# Patient Record
Sex: MALE | Race: WHITE | NOT HISPANIC OR LATINO | Employment: UNEMPLOYED | URBAN - METROPOLITAN AREA
[De-identification: names, ages, dates, MRNs, and addresses within clinical notes are randomized per-mention and may not be internally consistent; named-entity substitution may affect disease eponyms.]

---

## 2024-03-05 ENCOUNTER — APPOINTMENT (OUTPATIENT)
Dept: GENERAL RADIOLOGY | Facility: HOSPITAL | Age: 74
DRG: 066 | End: 2024-03-05
Payer: MEDICARE

## 2024-03-05 ENCOUNTER — APPOINTMENT (OUTPATIENT)
Dept: CT IMAGING | Facility: HOSPITAL | Age: 74
DRG: 066 | End: 2024-03-05
Payer: MEDICARE

## 2024-03-05 ENCOUNTER — HOSPITAL ENCOUNTER (INPATIENT)
Facility: HOSPITAL | Age: 74
LOS: 1 days | Discharge: HOME HEALTH | DRG: 066 | End: 2024-03-07
Attending: EMERGENCY MEDICINE | Admitting: FAMILY MEDICINE
Payer: MEDICARE

## 2024-03-05 DIAGNOSIS — R79.89 ELEVATED TROPONIN: ICD-10-CM

## 2024-03-05 DIAGNOSIS — G45.9 TIA (TRANSIENT ISCHEMIC ATTACK): Primary | ICD-10-CM

## 2024-03-05 LAB
ABO GROUP BLD: NORMAL
ALBUMIN SERPL-MCNC: 4.1 G/DL (ref 3.5–5.2)
ALBUMIN/GLOB SERPL: 1.2 G/DL
ALP SERPL-CCNC: 87 U/L (ref 39–117)
ALT SERPL W P-5'-P-CCNC: 9 U/L (ref 1–41)
ANION GAP SERPL CALCULATED.3IONS-SCNC: 10 MMOL/L (ref 5–15)
APTT PPP: 28 SECONDS (ref 24–31)
AST SERPL-CCNC: 28 U/L (ref 1–40)
BASOPHILS # BLD AUTO: 0 10*3/MM3 (ref 0–0.2)
BASOPHILS NFR BLD AUTO: 0.6 % (ref 0–1.5)
BILIRUB SERPL-MCNC: 0.6 MG/DL (ref 0–1.2)
BLD GP AB SCN SERPL QL: NEGATIVE
BUN SERPL-MCNC: 9 MG/DL (ref 8–23)
BUN/CREAT SERPL: 8.4 (ref 7–25)
CALCIUM SPEC-SCNC: 10.2 MG/DL (ref 8.6–10.5)
CHLORIDE SERPL-SCNC: 101 MMOL/L (ref 98–107)
CO2 SERPL-SCNC: 29 MMOL/L (ref 22–29)
CREAT SERPL-MCNC: 1.07 MG/DL (ref 0.76–1.27)
DEPRECATED RDW RBC AUTO: 47.7 FL (ref 37–54)
EGFRCR SERPLBLD CKD-EPI 2021: 72.4 ML/MIN/1.73
EOSINOPHIL # BLD AUTO: 0.6 10*3/MM3 (ref 0–0.4)
EOSINOPHIL NFR BLD AUTO: 7.4 % (ref 0.3–6.2)
ERYTHROCYTE [DISTWIDTH] IN BLOOD BY AUTOMATED COUNT: 14.8 % (ref 12.3–15.4)
GEN 5 2HR TROPONIN T REFLEX: 102 NG/L
GLOBULIN UR ELPH-MCNC: 3.5 GM/DL
GLUCOSE BLDC GLUCOMTR-MCNC: 110 MG/DL (ref 70–105)
GLUCOSE SERPL-MCNC: 119 MG/DL (ref 65–99)
HCT VFR BLD AUTO: 41.7 % (ref 37.5–51)
HGB BLD-MCNC: 13.7 G/DL (ref 13–17.7)
HOLD SPECIMEN: NORMAL
INR PPP: 1.06 (ref 0.93–1.1)
LYMPHOCYTES # BLD AUTO: 1.7 10*3/MM3 (ref 0.7–3.1)
LYMPHOCYTES NFR BLD AUTO: 22.1 % (ref 19.6–45.3)
MCH RBC QN AUTO: 28.8 PG (ref 26.6–33)
MCHC RBC AUTO-ENTMCNC: 32.8 G/DL (ref 31.5–35.7)
MCV RBC AUTO: 87.7 FL (ref 79–97)
MONOCYTES # BLD AUTO: 0.6 10*3/MM3 (ref 0.1–0.9)
MONOCYTES NFR BLD AUTO: 8.5 % (ref 5–12)
NEUTROPHILS NFR BLD AUTO: 4.7 10*3/MM3 (ref 1.7–7)
NEUTROPHILS NFR BLD AUTO: 61.4 % (ref 42.7–76)
NRBC BLD AUTO-RTO: 0 /100 WBC (ref 0–0.2)
PLATELET # BLD AUTO: 261 10*3/MM3 (ref 140–450)
PMV BLD AUTO: 7.3 FL (ref 6–12)
POTASSIUM SERPL-SCNC: 4 MMOL/L (ref 3.5–5.2)
PROT SERPL-MCNC: 7.6 G/DL (ref 6–8.5)
PROTHROMBIN TIME: 11.5 SECONDS (ref 9.6–11.7)
RBC # BLD AUTO: 4.76 10*6/MM3 (ref 4.14–5.8)
RH BLD: POSITIVE
SODIUM SERPL-SCNC: 140 MMOL/L (ref 136–145)
T&S EXPIRATION DATE: NORMAL
TROPONIN T DELTA: -15 NG/L
TROPONIN T SERPL HS-MCNC: 117 NG/L
WBC NRBC COR # BLD AUTO: 7.6 10*3/MM3 (ref 3.4–10.8)
WHOLE BLOOD HOLD COAG: NORMAL
WHOLE BLOOD HOLD SPECIMEN: NORMAL

## 2024-03-05 PROCEDURE — 86900 BLOOD TYPING SEROLOGIC ABO: CPT | Performed by: EMERGENCY MEDICINE

## 2024-03-05 PROCEDURE — 70496 CT ANGIOGRAPHY HEAD: CPT

## 2024-03-05 PROCEDURE — 70450 CT HEAD/BRAIN W/O DYE: CPT

## 2024-03-05 PROCEDURE — G0378 HOSPITAL OBSERVATION PER HR: HCPCS

## 2024-03-05 PROCEDURE — 86850 RBC ANTIBODY SCREEN: CPT | Performed by: EMERGENCY MEDICINE

## 2024-03-05 PROCEDURE — 82948 REAGENT STRIP/BLOOD GLUCOSE: CPT | Performed by: EMERGENCY MEDICINE

## 2024-03-05 PROCEDURE — 71045 X-RAY EXAM CHEST 1 VIEW: CPT

## 2024-03-05 PROCEDURE — 86900 BLOOD TYPING SEROLOGIC ABO: CPT

## 2024-03-05 PROCEDURE — 70498 CT ANGIOGRAPHY NECK: CPT

## 2024-03-05 PROCEDURE — 80053 COMPREHEN METABOLIC PANEL: CPT | Performed by: EMERGENCY MEDICINE

## 2024-03-05 PROCEDURE — 82607 VITAMIN B-12: CPT | Performed by: NURSE PRACTITIONER

## 2024-03-05 PROCEDURE — 99285 EMERGENCY DEPT VISIT HI MDM: CPT

## 2024-03-05 PROCEDURE — 86901 BLOOD TYPING SEROLOGIC RH(D): CPT

## 2024-03-05 PROCEDURE — 85025 COMPLETE CBC W/AUTO DIFF WBC: CPT | Performed by: EMERGENCY MEDICINE

## 2024-03-05 PROCEDURE — 86901 BLOOD TYPING SEROLOGIC RH(D): CPT | Performed by: EMERGENCY MEDICINE

## 2024-03-05 PROCEDURE — 85730 THROMBOPLASTIN TIME PARTIAL: CPT | Performed by: EMERGENCY MEDICINE

## 2024-03-05 PROCEDURE — 85610 PROTHROMBIN TIME: CPT | Performed by: EMERGENCY MEDICINE

## 2024-03-05 PROCEDURE — 84484 ASSAY OF TROPONIN QUANT: CPT | Performed by: EMERGENCY MEDICINE

## 2024-03-05 PROCEDURE — 25510000001 IOPAMIDOL PER 1 ML: Performed by: EMERGENCY MEDICINE

## 2024-03-05 PROCEDURE — 93005 ELECTROCARDIOGRAM TRACING: CPT | Performed by: EMERGENCY MEDICINE

## 2024-03-05 RX ORDER — LATANOPROST 50 UG/ML
1 SOLUTION/ DROPS OPHTHALMIC NIGHTLY
COMMUNITY

## 2024-03-05 RX ORDER — ASPIRIN 325 MG
325 TABLET ORAL ONCE
Status: COMPLETED | OUTPATIENT
Start: 2024-03-05 | End: 2024-03-05

## 2024-03-05 RX ORDER — CLOPIDOGREL BISULFATE 75 MG/1
75 TABLET ORAL ONCE
Status: COMPLETED | OUTPATIENT
Start: 2024-03-05 | End: 2024-03-05

## 2024-03-05 RX ORDER — ROSUVASTATIN CALCIUM 10 MG/1
10 TABLET, COATED ORAL DAILY
COMMUNITY
End: 2024-03-07 | Stop reason: HOSPADM

## 2024-03-05 RX ORDER — SODIUM CHLORIDE 0.9 % (FLUSH) 0.9 %
10 SYRINGE (ML) INJECTION AS NEEDED
Status: DISCONTINUED | OUTPATIENT
Start: 2024-03-05 | End: 2024-03-07 | Stop reason: HOSPADM

## 2024-03-05 RX ORDER — IRBESARTAN AND HYDROCHLOROTHIAZIDE 150; 12.5 MG/1; MG/1
0.5 TABLET, FILM COATED ORAL DAILY
COMMUNITY

## 2024-03-05 RX ADMIN — ASPIRIN 325 MG ORAL TABLET 325 MG: 325 PILL ORAL at 21:38

## 2024-03-05 RX ADMIN — CLOPIDOGREL BISULFATE 75 MG: 75 TABLET ORAL at 21:38

## 2024-03-05 RX ADMIN — IOPAMIDOL 100 ML: 755 INJECTION, SOLUTION INTRAVENOUS at 17:56

## 2024-03-05 NOTE — Clinical Note
Level of Care: Telemetry [5]   Diagnosis: TIA (transient ischemic attack) [441645]   Admitting Physician: VIANEY FAGAN [[id number]]   Attending Physician: VIANEY FAGAN [[id number]]

## 2024-03-05 NOTE — ED NOTES
Pt states numbness started at home last night in R side face, R arm/hand, & R leg after long car ride; pt states symptoms subsided & is complaining of R arm numbness today; pt face symmetrical & equal, pt  strengths equal,; pt denies medical history at this time

## 2024-03-05 NOTE — ED PROVIDER NOTES
"Subjective   History of Present Illness  73-year-old male states he noted some right arm and leg weakness yesterday evening when he was in his recliner.  He states it lasted several minutes and resolved.  He states he also had some blurry vision at the time.  States today around noon he had the same but reports no blurry vision today.  He reports no headache.  He states his arm and leg felt numb and tingly.  He states that his leg has resolved he states he still feels slightly tingling in his right arm.  He reports no chest pain or abdominal pain or speech difficulty.  Review of Systems    No past medical history on file.    No Known Allergies    No past surgical history on file.    No family history on file.    Social History     Socioeconomic History    Marital status:      Prior to Admission medications    Not on File     /74 (BP Location: Left arm, Patient Position: Lying)   Pulse 61   Temp 98.7 °F (37.1 °C) (Oral)   Resp 13   Ht 182.9 cm (72\")   Wt 124 kg (273 lb 5.9 oz)   SpO2 98%   BMI 37.08 kg/m²         Objective   Physical Exam  General: Well-developed well-appearing, no acute distress, alert and appropriate  Eyes: Pupils round and reactive, sclera nonicteric  HEENT: Mucous membranes moist, no mucosal swelling  Neck: Supple, no nuchal rigidity, no lymphadenopathy  Respirations: Respirations nonlabored, equal breath sounds bilaterally, clear lungs  Heart regular rate and rhythm, no murmurs rubs or gallops,   Abdomen soft nontender nondistended, no hepatosplenomegaly, no hernia, no mass, normal bowel sounds, no CVA tenderness  Extremities no clubbing cyanosis or edema, calves are symmetric and nontender  Neuro cranial nerves II through XII intact , normal sensory/motor function and strength in four extremities, no slurred speech, no facial droop, normal finger to nose, normal mentation  Psych oriented, pleasant affect  Skin no rash, brisk cap refill  Procedures           ED Course  ED " Course as of 03/05/24 1918   Tue Mar 05, 2024   1632 Patient has had some tingling and numbness on the right side on and off since yesterday returned around noon about 4-1/2 hours prior to evaluation.  Patient is not a tPA candidate based on time of onset.  He is not exhibiting signs of large vessel occlusion on exam he has a normal neurologic exam currently.  Code stroke was not initiated due to these factors. []   1823 Case and findings of CTA and CT head discussed with Dr. Flores who does recommend aspirin and Plavix and admission for further stroke evaluation. []      ED Course User Index  [] Candido Krishna MD                          Total (NIH Stroke Scale): 0        Results for orders placed or performed during the hospital encounter of 03/05/24   Comprehensive Metabolic Panel    Specimen: Blood   Result Value Ref Range    Glucose 119 (H) 65 - 99 mg/dL    BUN 9 8 - 23 mg/dL    Creatinine 1.07 0.76 - 1.27 mg/dL    Sodium 140 136 - 145 mmol/L    Potassium 4.0 3.5 - 5.2 mmol/L    Chloride 101 98 - 107 mmol/L    CO2 29.0 22.0 - 29.0 mmol/L    Calcium 10.2 8.6 - 10.5 mg/dL    Total Protein 7.6 6.0 - 8.5 g/dL    Albumin 4.1 3.5 - 5.2 g/dL    ALT (SGPT) 9 1 - 41 U/L    AST (SGOT) 28 1 - 40 U/L    Alkaline Phosphatase 87 39 - 117 U/L    Total Bilirubin 0.6 0.0 - 1.2 mg/dL    Globulin 3.5 gm/dL    A/G Ratio 1.2 g/dL    BUN/Creatinine Ratio 8.4 7.0 - 25.0    Anion Gap 10.0 5.0 - 15.0 mmol/L    eGFR 72.4 >60.0 mL/min/1.73   Protime-INR    Specimen: Blood   Result Value Ref Range    Protime 11.5 9.6 - 11.7 Seconds    INR 1.06 0.93 - 1.10   aPTT    Specimen: Blood   Result Value Ref Range    PTT 28.0 24.0 - 31.0 seconds   Single High Sensitivity Troponin T    Specimen: Blood   Result Value Ref Range    HS Troponin T 117 (C) <22 ng/L   CBC Auto Differential    Specimen: Blood   Result Value Ref Range    WBC 7.60 3.40 - 10.80 10*3/mm3    RBC 4.76 4.14 - 5.80 10*6/mm3    Hemoglobin 13.7 13.0 - 17.7 g/dL     Hematocrit 41.7 37.5 - 51.0 %    MCV 87.7 79.0 - 97.0 fL    MCH 28.8 26.6 - 33.0 pg    MCHC 32.8 31.5 - 35.7 g/dL    RDW 14.8 12.3 - 15.4 %    RDW-SD 47.7 37.0 - 54.0 fl    MPV 7.3 6.0 - 12.0 fL    Platelets 261 140 - 450 10*3/mm3    Neutrophil % 61.4 42.7 - 76.0 %    Lymphocyte % 22.1 19.6 - 45.3 %    Monocyte % 8.5 5.0 - 12.0 %    Eosinophil % 7.4 (H) 0.3 - 6.2 %    Basophil % 0.6 0.0 - 1.5 %    Neutrophils, Absolute 4.70 1.70 - 7.00 10*3/mm3    Lymphocytes, Absolute 1.70 0.70 - 3.10 10*3/mm3    Monocytes, Absolute 0.60 0.10 - 0.90 10*3/mm3    Eosinophils, Absolute 0.60 (H) 0.00 - 0.40 10*3/mm3    Basophils, Absolute 0.00 0.00 - 0.20 10*3/mm3    nRBC 0.0 0.0 - 0.2 /100 WBC   POC Glucose Once    Specimen: Blood   Result Value Ref Range    Glucose 110 (H) 70 - 105 mg/dL   ECG 12 Lead Stroke Evaluation   Result Value Ref Range    QT Interval 364 ms    QTC Interval 420 ms   Type & Screen    Specimen: Blood   Result Value Ref Range    ABO Type O     RH type Positive     Antibody Screen Negative     T&S Expiration Date 3/8/2024 11:59:59 PM    Green Top (Gel)   Result Value Ref Range    Extra Tube hold    Lavender Top   Result Value Ref Range    Extra Tube hold for add-on    Light Blue Top   Result Value Ref Range    Extra Tube Hold for add-ons.      CT Angiogram Head w AI Analysis of LVO    Result Date: 3/5/2024  Impression: A few focal areas of severe stenosis to near occlusion noted within the distal right M2/M3 segments as characterized above. Additionally there is severe stenosis to near occlusion of the distal left P1 segment/P2 segment, although the PCA is patent more distally. Please consider follow-up with dedicated MRI of the brain to exclude an acute infarct. Additional mild to moderate intracranial stenoses as characterized above. No significant stenoses within the neck. Incidentally noted ectasia of the ascending thoracic aorta and aneurysmal dilation of the descending thoracic aorta. Electronically Signed:  Pablito Liseth,   3/5/2024 6:14 PM EST  Workstation ID: [id number]    CT Angiogram Neck    Result Date: 3/5/2024  Impression: A few focal areas of severe stenosis to near occlusion noted within the distal right M2/M3 segments as characterized above. Additionally there is severe stenosis to near occlusion of the distal left P1 segment/P2 segment, although the PCA is patent more distally. Please consider follow-up with dedicated MRI of the brain to exclude an acute infarct. Additional mild to moderate intracranial stenoses as characterized above. No significant stenoses within the neck. Incidentally noted ectasia of the ascending thoracic aorta and aneurysmal dilation of the descending thoracic aorta. Electronically Signed: Pablitozeke Childers,   3/5/2024 6:14 PM EST  Workstation ID: [id number]    CT Head Without Contrast Stroke Protocol    Result Date: 3/5/2024  Impression: No acute intracranial pathology. Electronically Signed: Siddhartha Hidalgo MD  3/5/2024 5:50 PM EST  Workstation ID: [id number]    XR Chest 1 View    Result Date: 3/5/2024  Impression: 1.Cardiomegaly. Electronically Signed: Eladio Barron MD  3/5/2024 5:26 PM EST  Workstation ID: [id number]             Medical Decision Making  Patient presents with some intermittent neurologic symptoms with right-sided numbness suggestive of TIA differential diagnosis including CVA, seizure    NIH of 0.  Patient ETH to show several areas of stenosis intracranially.  In discussion with neurology he was placed on Plavix and aspirin.  He will be admitted the hospital for further stroke evaluation.  Patient is agreeable to plan he was advised of findings.  Case discussed with Ying with the [facility] service for admission.  Patient remained neurologically stable throughout the emergency room course and continues to have no chest pain or shortness of breath to suggest cardiac ischemia.    Problems Addressed:  Elevated troponin: complicated acute illness or injury  TIA (transient  ischemic attack): complicated acute illness or injury    Amount and/or Complexity of Data Reviewed  Labs: ordered. Decision-making details documented in ED Course.     Details: High-sensitivity troponin is elevated.  Notably patient is not describing any active chest pain to suggest any ongoing cardiac ischemia and his EKG shows no ischemic abnormalities.  CBC normal, comprehensive metabolic panel essentially normal  Radiology: ordered and independent interpretation performed.     Details: My independent interpretation of CT head no apparent acute hemorrhage  ECG/medicine tests: ordered and independent interpretation performed.     Details: My EKG interpretation sinus rhythm rate of 80, no acute ST or T wave abnormality, first-degree AV block    Risk  OTC drugs.  Prescription drug management.  Decision regarding hospitalization.        Final diagnoses:   TIA (transient ischemic attack)   Elevated troponin       ED Disposition  ED Disposition       ED Disposition   Decision to Admit    Condition   --    Comment   Level of Care: Telemetry [5]   Admitting Physician: DENEEN WHITTINGTON [[id number]]   Attending Physician: DENEEN WHITTINGTON [[id number]]   Bed Request Comments: manny                 No follow-up provider specified.       Medication List      No changes were made to your prescriptions during this visit.            Candido Krishna MD  03/05/24 [id number]

## 2024-03-06 ENCOUNTER — APPOINTMENT (OUTPATIENT)
Dept: MRI IMAGING | Facility: HOSPITAL | Age: 74
DRG: 066 | End: 2024-03-06
Payer: MEDICARE

## 2024-03-06 ENCOUNTER — APPOINTMENT (OUTPATIENT)
Dept: CARDIOLOGY | Facility: HOSPITAL | Age: 74
DRG: 066 | End: 2024-03-06
Payer: MEDICARE

## 2024-03-06 LAB
ANION GAP SERPL CALCULATED.3IONS-SCNC: 11 MMOL/L (ref 5–15)
AORTIC DIMENSIONLESS INDEX: 0.83 (DI)
BASOPHILS # BLD AUTO: 0.2 10*3/MM3 (ref 0–0.2)
BASOPHILS NFR BLD AUTO: 2.1 % (ref 0–1.5)
BH CV ECHO MEAS - ACS: 2.2 CM
BH CV ECHO MEAS - AO MAX PG: 6.4 MMHG
BH CV ECHO MEAS - AO MEAN PG: 3 MMHG
BH CV ECHO MEAS - AO V2 MAX: 126 CM/SEC
BH CV ECHO MEAS - AO V2 VTI: 27.2 CM
BH CV ECHO MEAS - AVA(I,D): 3.8 CM2
BH CV ECHO MEAS - EDV(CUBED): 148.9 ML
BH CV ECHO MEAS - EDV(MOD-SP4): 88.6 ML
BH CV ECHO MEAS - EF(MOD-BP): 69 %
BH CV ECHO MEAS - EF(MOD-SP4): 69 %
BH CV ECHO MEAS - ESV(CUBED): 35.9 ML
BH CV ECHO MEAS - ESV(MOD-SP4): 27.5 ML
BH CV ECHO MEAS - FS: 37.7 %
BH CV ECHO MEAS - IVS/LVPW: 1.1 CM
BH CV ECHO MEAS - IVSD: 1.1 CM
BH CV ECHO MEAS - LA DIMENSION: 4 CM
BH CV ECHO MEAS - LAT PEAK E' VEL: 12.2 CM/SEC
BH CV ECHO MEAS - LV DIASTOLIC VOL/BSA (35-75): 36.4 CM2
BH CV ECHO MEAS - LV MASS(C)D: 213.9 GRAMS
BH CV ECHO MEAS - LV MAX PG: 4.4 MMHG
BH CV ECHO MEAS - LV MEAN PG: 2 MMHG
BH CV ECHO MEAS - LV SYSTOLIC VOL/BSA (12-30): 11.3 CM2
BH CV ECHO MEAS - LV V1 MAX: 105 CM/SEC
BH CV ECHO MEAS - LV V1 VTI: 23 CM
BH CV ECHO MEAS - LVIDD: 5.3 CM
BH CV ECHO MEAS - LVIDS: 3.3 CM
BH CV ECHO MEAS - LVOT AREA: 4.5 CM2
BH CV ECHO MEAS - LVOT DIAM: 2.4 CM
BH CV ECHO MEAS - LVPWD: 1 CM
BH CV ECHO MEAS - MED PEAK E' VEL: 6.4 CM/SEC
BH CV ECHO MEAS - MV A MAX VEL: 80.7 CM/SEC
BH CV ECHO MEAS - MV DEC SLOPE: 403 CM/SEC2
BH CV ECHO MEAS - MV DEC TIME: 0.29 SEC
BH CV ECHO MEAS - MV E MAX VEL: 50.3 CM/SEC
BH CV ECHO MEAS - MV E/A: 0.62
BH CV ECHO MEAS - MV MAX PG: 5.5 MMHG
BH CV ECHO MEAS - MV MEAN PG: 3 MMHG
BH CV ECHO MEAS - MV P1/2T: 68.4 MSEC
BH CV ECHO MEAS - MV V2 VTI: 30.9 CM
BH CV ECHO MEAS - MVA(P1/2T): 3.2 CM2
BH CV ECHO MEAS - MVA(VTI): 3.4 CM2
BH CV ECHO MEAS - PA ACC TIME: 0.11 SEC
BH CV ECHO MEAS - PA V2 MAX: 118 CM/SEC
BH CV ECHO MEAS - PULM A REVS DUR: 0.11 SEC
BH CV ECHO MEAS - PULM A REVS VEL: 26.4 CM/SEC
BH CV ECHO MEAS - PULM DIAS VEL: 38 CM/SEC
BH CV ECHO MEAS - PULM S/D: 1.52
BH CV ECHO MEAS - PULM SYS VEL: 57.9 CM/SEC
BH CV ECHO MEAS - RAP SYSTOLE: 8 MMHG
BH CV ECHO MEAS - RV MAX PG: 3.5 MMHG
BH CV ECHO MEAS - RV V1 MAX: 93.4 CM/SEC
BH CV ECHO MEAS - RV V1 VTI: 22.3 CM
BH CV ECHO MEAS - RVDD: 3.2 CM
BH CV ECHO MEAS - RVSP: 32 MMHG
BH CV ECHO MEAS - SI(MOD-SP4): 25.1 ML/M2
BH CV ECHO MEAS - SV(LVOT): 104 ML
BH CV ECHO MEAS - SV(MOD-SP4): 61.1 ML
BH CV ECHO MEAS - TAPSE (>1.6): 1.73 CM
BH CV ECHO MEAS - TR MAX PG: 24 MMHG
BH CV ECHO MEAS - TR MAX VEL: 245 CM/SEC
BH CV ECHO MEASUREMENTS AVERAGE E/E' RATIO: 5.41
BH CV XLRA - TDI S': 11.6 CM/SEC
BUN SERPL-MCNC: 10 MG/DL (ref 8–23)
BUN/CREAT SERPL: 9.7 (ref 7–25)
CALCIUM SPEC-SCNC: 9.8 MG/DL (ref 8.6–10.5)
CHLORIDE SERPL-SCNC: 101 MMOL/L (ref 98–107)
CHOLEST SERPL-MCNC: 106 MG/DL (ref 0–200)
CO2 SERPL-SCNC: 27 MMOL/L (ref 22–29)
CREAT SERPL-MCNC: 1.03 MG/DL (ref 0.76–1.27)
DEPRECATED RDW RBC AUTO: 47.7 FL (ref 37–54)
EGFRCR SERPLBLD CKD-EPI 2021: 75.8 ML/MIN/1.73
EOSINOPHIL # BLD AUTO: 1 10*3/MM3 (ref 0–0.4)
EOSINOPHIL NFR BLD AUTO: 13.9 % (ref 0.3–6.2)
ERYTHROCYTE [DISTWIDTH] IN BLOOD BY AUTOMATED COUNT: 15.1 % (ref 12.3–15.4)
GLUCOSE BLDC GLUCOMTR-MCNC: 85 MG/DL (ref 70–105)
GLUCOSE SERPL-MCNC: 94 MG/DL (ref 65–99)
HBA1C MFR BLD: 5.9 % (ref 4.8–5.6)
HCT VFR BLD AUTO: 39.9 % (ref 37.5–51)
HDLC SERPL-MCNC: 38 MG/DL (ref 40–60)
HGB BLD-MCNC: 13 G/DL (ref 13–17.7)
LDLC SERPL CALC-MCNC: 50 MG/DL (ref 0–100)
LDLC/HDLC SERPL: 1.29 {RATIO}
LEFT ATRIUM VOLUME INDEX: 19 ML/M2
LYMPHOCYTES # BLD AUTO: 2.1 10*3/MM3 (ref 0.7–3.1)
LYMPHOCYTES NFR BLD AUTO: 29.7 % (ref 19.6–45.3)
MCH RBC QN AUTO: 29.4 PG (ref 26.6–33)
MCHC RBC AUTO-ENTMCNC: 32.5 G/DL (ref 31.5–35.7)
MCV RBC AUTO: 90.4 FL (ref 79–97)
MONOCYTES # BLD AUTO: 0.7 10*3/MM3 (ref 0.1–0.9)
MONOCYTES NFR BLD AUTO: 9.6 % (ref 5–12)
NEUTROPHILS NFR BLD AUTO: 3.2 10*3/MM3 (ref 1.7–7)
NEUTROPHILS NFR BLD AUTO: 44.7 % (ref 42.7–76)
NRBC BLD AUTO-RTO: 0.1 /100 WBC (ref 0–0.2)
PLATELET # BLD AUTO: 264 10*3/MM3 (ref 140–450)
PMV BLD AUTO: 7.6 FL (ref 6–12)
POTASSIUM SERPL-SCNC: 4 MMOL/L (ref 3.5–5.2)
QT INTERVAL: 364 MS
QTC INTERVAL: 420 MS
RBC # BLD AUTO: 4.42 10*6/MM3 (ref 4.14–5.8)
SINUS: 4 CM
SODIUM SERPL-SCNC: 139 MMOL/L (ref 136–145)
TRIGL SERPL-MCNC: 94 MG/DL (ref 0–150)
TROPONIN T SERPL HS-MCNC: 67 NG/L
TSH SERPL DL<=0.05 MIU/L-ACNC: 2.89 UIU/ML (ref 0.27–4.2)
VIT B12 BLD-MCNC: 296 PG/ML (ref 211–946)
VLDLC SERPL-MCNC: 18 MG/DL (ref 5–40)
WBC NRBC COR # BLD AUTO: 7.2 10*3/MM3 (ref 3.4–10.8)

## 2024-03-06 PROCEDURE — 70551 MRI BRAIN STEM W/O DYE: CPT

## 2024-03-06 PROCEDURE — 83036 HEMOGLOBIN GLYCOSYLATED A1C: CPT | Performed by: NURSE PRACTITIONER

## 2024-03-06 PROCEDURE — 97162 PT EVAL MOD COMPLEX 30 MIN: CPT

## 2024-03-06 PROCEDURE — 36415 COLL VENOUS BLD VENIPUNCTURE: CPT

## 2024-03-06 PROCEDURE — 80061 LIPID PANEL: CPT | Performed by: NURSE PRACTITIONER

## 2024-03-06 PROCEDURE — G0378 HOSPITAL OBSERVATION PER HR: HCPCS

## 2024-03-06 PROCEDURE — 84484 ASSAY OF TROPONIN QUANT: CPT

## 2024-03-06 PROCEDURE — 80048 BASIC METABOLIC PNL TOTAL CA: CPT

## 2024-03-06 PROCEDURE — 82948 REAGENT STRIP/BLOOD GLUCOSE: CPT | Performed by: NURSE PRACTITIONER

## 2024-03-06 PROCEDURE — 85025 COMPLETE CBC W/AUTO DIFF WBC: CPT

## 2024-03-06 PROCEDURE — 84443 ASSAY THYROID STIM HORMONE: CPT | Performed by: NURSE PRACTITIONER

## 2024-03-06 PROCEDURE — 97167 OT EVAL HIGH COMPLEX 60 MIN: CPT

## 2024-03-06 PROCEDURE — 99223 1ST HOSP IP/OBS HIGH 75: CPT | Performed by: NURSE PRACTITIONER

## 2024-03-06 PROCEDURE — 93306 TTE W/DOPPLER COMPLETE: CPT | Performed by: INTERNAL MEDICINE

## 2024-03-06 PROCEDURE — 93306 TTE W/DOPPLER COMPLETE: CPT

## 2024-03-06 RX ORDER — ASPIRIN 81 MG/1
81 TABLET ORAL DAILY
Status: DISCONTINUED | OUTPATIENT
Start: 2024-03-06 | End: 2024-03-07 | Stop reason: HOSPADM

## 2024-03-06 RX ORDER — HYDRALAZINE HYDROCHLORIDE 20 MG/ML
10 INJECTION INTRAMUSCULAR; INTRAVENOUS EVERY 6 HOURS PRN
Status: DISCONTINUED | OUTPATIENT
Start: 2024-03-06 | End: 2024-03-07 | Stop reason: HOSPADM

## 2024-03-06 RX ORDER — LOSARTAN POTASSIUM 50 MG/1
50 TABLET ORAL
Status: DISCONTINUED | OUTPATIENT
Start: 2024-03-06 | End: 2024-03-06

## 2024-03-06 RX ORDER — FAMOTIDINE 20 MG/1
40 TABLET, FILM COATED ORAL DAILY
Status: DISCONTINUED | OUTPATIENT
Start: 2024-03-06 | End: 2024-03-07 | Stop reason: HOSPADM

## 2024-03-06 RX ORDER — NITROGLYCERIN 0.4 MG/1
0.4 TABLET SUBLINGUAL
Status: DISCONTINUED | OUTPATIENT
Start: 2024-03-06 | End: 2024-03-07 | Stop reason: HOSPADM

## 2024-03-06 RX ORDER — AMOXICILLIN 250 MG
2 CAPSULE ORAL 2 TIMES DAILY PRN
Status: DISCONTINUED | OUTPATIENT
Start: 2024-03-06 | End: 2024-03-07 | Stop reason: HOSPADM

## 2024-03-06 RX ORDER — HYDROCHLOROTHIAZIDE 12.5 MG/1
12.5 TABLET ORAL
Status: DISCONTINUED | OUTPATIENT
Start: 2024-03-06 | End: 2024-03-06

## 2024-03-06 RX ORDER — ACETAMINOPHEN 325 MG/1
650 TABLET ORAL EVERY 4 HOURS PRN
Status: DISCONTINUED | OUTPATIENT
Start: 2024-03-06 | End: 2024-03-07 | Stop reason: HOSPADM

## 2024-03-06 RX ORDER — LOSARTAN POTASSIUM 50 MG/1
100 TABLET ORAL
Status: DISCONTINUED | OUTPATIENT
Start: 2024-03-07 | End: 2024-03-07 | Stop reason: HOSPADM

## 2024-03-06 RX ORDER — BISACODYL 5 MG/1
5 TABLET, DELAYED RELEASE ORAL DAILY PRN
Status: DISCONTINUED | OUTPATIENT
Start: 2024-03-06 | End: 2024-03-07 | Stop reason: HOSPADM

## 2024-03-06 RX ORDER — ROSUVASTATIN CALCIUM 10 MG/1
20 TABLET, COATED ORAL DAILY
Status: DISCONTINUED | OUTPATIENT
Start: 2024-03-07 | End: 2024-03-07 | Stop reason: HOSPADM

## 2024-03-06 RX ORDER — ACETAMINOPHEN 650 MG
TABLET, EXTENDED RELEASE ORAL DAILY
Status: DISCONTINUED | OUTPATIENT
Start: 2024-03-06 | End: 2024-03-07 | Stop reason: HOSPADM

## 2024-03-06 RX ORDER — LATANOPROST 50 UG/ML
1 SOLUTION/ DROPS OPHTHALMIC NIGHTLY
Status: DISCONTINUED | OUTPATIENT
Start: 2024-03-06 | End: 2024-03-07 | Stop reason: HOSPADM

## 2024-03-06 RX ORDER — ROSUVASTATIN CALCIUM 10 MG/1
10 TABLET, COATED ORAL DAILY
Status: DISCONTINUED | OUTPATIENT
Start: 2024-03-06 | End: 2024-03-06

## 2024-03-06 RX ORDER — BISACODYL 10 MG
10 SUPPOSITORY, RECTAL RECTAL DAILY PRN
Status: DISCONTINUED | OUTPATIENT
Start: 2024-03-06 | End: 2024-03-07 | Stop reason: HOSPADM

## 2024-03-06 RX ORDER — ONDANSETRON 2 MG/ML
4 INJECTION INTRAMUSCULAR; INTRAVENOUS EVERY 6 HOURS PRN
Status: DISCONTINUED | OUTPATIENT
Start: 2024-03-06 | End: 2024-03-07 | Stop reason: HOSPADM

## 2024-03-06 RX ORDER — SODIUM CHLORIDE 0.9 % (FLUSH) 0.9 %
10 SYRINGE (ML) INJECTION AS NEEDED
Status: DISCONTINUED | OUTPATIENT
Start: 2024-03-06 | End: 2024-03-07 | Stop reason: HOSPADM

## 2024-03-06 RX ORDER — SODIUM CHLORIDE 0.9 % (FLUSH) 0.9 %
10 SYRINGE (ML) INJECTION EVERY 12 HOURS SCHEDULED
Status: DISCONTINUED | OUTPATIENT
Start: 2024-03-06 | End: 2024-03-07 | Stop reason: HOSPADM

## 2024-03-06 RX ORDER — CLOPIDOGREL BISULFATE 75 MG/1
75 TABLET ORAL DAILY
Status: DISCONTINUED | OUTPATIENT
Start: 2024-03-06 | End: 2024-03-07 | Stop reason: HOSPADM

## 2024-03-06 RX ORDER — SODIUM CHLORIDE 9 MG/ML
40 INJECTION, SOLUTION INTRAVENOUS AS NEEDED
Status: DISCONTINUED | OUTPATIENT
Start: 2024-03-06 | End: 2024-03-07 | Stop reason: HOSPADM

## 2024-03-06 RX ORDER — HYDROCHLOROTHIAZIDE 12.5 MG/1
12.5 TABLET ORAL
Status: DISCONTINUED | OUTPATIENT
Start: 2024-03-07 | End: 2024-03-07 | Stop reason: HOSPADM

## 2024-03-06 RX ORDER — POLYETHYLENE GLYCOL 3350 17 G/17G
17 POWDER, FOR SOLUTION ORAL DAILY PRN
Status: DISCONTINUED | OUTPATIENT
Start: 2024-03-06 | End: 2024-03-07 | Stop reason: HOSPADM

## 2024-03-06 RX ADMIN — LATANOPROST 1 DROP: 50 SOLUTION/ DROPS OPHTHALMIC at 21:04

## 2024-03-06 RX ADMIN — CLOPIDOGREL BISULFATE 75 MG: 75 TABLET ORAL at 11:48

## 2024-03-06 RX ADMIN — POVIDONE-IODINE: 10 SOLUTION TOPICAL at 16:25

## 2024-03-06 RX ADMIN — LOSARTAN POTASSIUM 50 MG: 50 TABLET, FILM COATED ORAL at 08:26

## 2024-03-06 RX ADMIN — FAMOTIDINE 40 MG: 20 TABLET, FILM COATED ORAL at 08:26

## 2024-03-06 RX ADMIN — ASPIRIN 81 MG: 81 TABLET, COATED ORAL at 11:48

## 2024-03-06 RX ADMIN — Medication 10 ML: at 05:51

## 2024-03-06 RX ADMIN — HYDROCHLOROTHIAZIDE 12.5 MG: 12.5 TABLET ORAL at 08:26

## 2024-03-06 RX ADMIN — Medication 10 ML: at 08:27

## 2024-03-06 RX ADMIN — ROSUVASTATIN 10 MG: 10 TABLET, FILM COATED ORAL at 08:26

## 2024-03-06 NOTE — PLAN OF CARE
Goal Outcome Evaluation:  Plan of Care Reviewed With: patient, sibling           Outcome Evaluation: Pt is a 74 y/o male with an unkown PMH, presented to the ER with reports of right arm and leg weakness, and blurry vision 3/4/24 when he was sitting in the recliner that resolved after a few minutes. He reports 3/5/24 around noon he had the same symptoms but did not have blurry vision. Per neurology pt had Subacute stroke within the left PCA territory. Pt stated he had just travelled for 13 hours for a  prior to onset of symptoms. PLOF was indep with ADLs, driving, and mobility w/o AD. Pt lives alone in a H with 3 EMELY. Pt states he's had 1 fall in the past 6 months Today pt required CGA with ambulation with no AD, narrow DEIRDRE. Pt hypertensive at rest 166/87, in sitting at 173/87, standing 166/94, and after activity 180/109. After returned to supine BP decreased to 155/74. All other vitals WNL. Vision testing including convergence and visual field was normal. Coordination testing of upper and lower extremities was normal. Due to pt presentation, recommending return to home with assist from family nearby as needed. PT will follow until d/c.      Anticipated Discharge Disposition (PT): home with assist

## 2024-03-06 NOTE — PROGRESS NOTES
LOS: 0 days   Patient Care Team:  Abel Perez MD as PCP - General (Family Medicine)    Subjective     Interval History:     Patient Complaints: pt denies any weakness or numbness today.  No speech issues    History taken from: patient    Review of Systems        Objective     Vital Signs  Temp:  [97.4 °F (36.3 °C)-98 °F (36.7 °C)] 97.4 °F (36.3 °C)  Heart Rate:  [52-77] 59  Resp:  [12-23] 12  BP: (112-166)/(59-89) 134/67    Physical Exam:     General Appearance:    Alert, cooperative, in no acute distress   Head:    Normocephalic, without obvious abnormality, atraumatic   Eyes:            Lids and lashes normal, conjunctivae and sclerae normal, no   icterus, no pallor, corneas clear, PERRLA   Ears:    Ears appear intact with no abnormalities noted   Throat:   No oral lesions, no thrush, oral mucosa moist   Neck:   No adenopathy, supple, trachea midline, no thyromegaly, no   carotid bruit, no JVD   Lungs:     Clear to auscultation,respirations regular, even and                  unlabored    Heart:    Regular rhythm and normal rate, normal S1 and S2, no            murmur, no gallop, no rub, no click   Chest Wall:    No abnormalities observed   Abdomen:     Normal bowel sounds, no masses, no organomegaly, soft        non-tender, non-distended, no guarding, no rebound                tenderness   Extremities:   Moves all extremities well, no edema, no cyanosis, no             redness   Pulses:   Pulses palpable and equal bilaterally   Skin:   No bleeding, bruising or rash   Lymph nodes:   No palpable adenopathy   Neurologic:   Cranial nerves 2 - 12 grossly intact, sensation intact, DTR       present and equal bilaterally        Results Review:    Lab Results (last 24 hours)       Procedure Component Value Units Date/Time    Vitamin B12 [[id number]]  (Normal) Collected: 03/05/24 1644    Specimen: Blood Updated: 03/06/24 1632     Vitamin B-12 296 pg/mL     Narrative:      Results may be falsely increased if patient  taking Biotin.      POC Glucose Q6H [[id number]]  (Normal) Collected: 03/06/24 1256    Specimen: Blood Updated: 03/06/24 1258     Glucose 85 mg/dL      Comment: Serial Number: [id number]:  [id number]       Hemoglobin A1c [[id number]]  (Abnormal) Collected: 03/06/24 0551    Specimen: Blood Updated: 03/06/24 1035     Hemoglobin A1C 5.90 %     TSH [[id number]]  (Normal) Collected: 03/06/24 0551    Specimen: Blood Updated: 03/06/24 1028     TSH 2.890 uIU/mL     Lipid Panel [[id number]]  (Abnormal) Collected: 03/06/24 0551    Specimen: Blood Updated: 03/06/24 1025     Total Cholesterol 106 mg/dL      Triglycerides 94 mg/dL      HDL Cholesterol 38 mg/dL      LDL Cholesterol  50 mg/dL      VLDL Cholesterol 18 mg/dL      LDL/HDL Ratio 1.29    Narrative:      Cholesterol Reference Ranges  (U.[place]. Department of Health and Human Services ATP III Classifications)    Desirable          <200 mg/dL  Borderline High    200-239 mg/dL  High Risk          >240 mg/dL      Triglyceride Reference Ranges  (U.S. Department of Health and Human Services ATP III Classifications)    Normal           <150 mg/dL  Borderline High  150-199 mg/dL  High             200-499 mg/dL  Very High        >500 mg/dL    HDL Reference Ranges  (U.S. Department of Health and Human Services ATP III Classifications)    Low     <40 mg/dl (major risk factor for CHD)  High    >60 mg/dl ('negative' risk factor for CHD)        LDL Reference Ranges  (U.S. Department of Health and Human Services ATP III Classifications)    Optimal          <100 mg/dL  Near Optimal     100-129 mg/dL  Borderline High  130-159 mg/dL  High             160-189 mg/dL  Very High        >189 mg/dL    Basic Metabolic Panel [[id number]]  (Normal) Collected: 03/06/24 0551    Specimen: Blood Updated: 03/06/24 0641     Glucose 94 mg/dL      BUN 10 mg/dL      Creatinine 1.03 mg/dL      Sodium 139 mmol/L      Potassium 4.0 mmol/L      Comment: Slight hemolysis detected by analyzer. Result may be falsely  elevated.        Chloride 101 mmol/L      CO2 27.0 mmol/L      Calcium 9.8 mg/dL      BUN/Creatinine Ratio 9.7     Anion Gap 11.0 mmol/L      eGFR 75.8 mL/min/1.73     Narrative:      GFR Normal >60  Chronic Kidney Disease <60  Kidney Failure <15    The GFR formula is only valid for adults with stable renal function between ages 18 and 70.    High Sensitivity Troponin T [[id number]]  (Abnormal) Collected: 03/06/24 0551    Specimen: Blood Updated: 03/06/24 0640     HS Troponin T 67 ng/L     Narrative:      High Sensitive Troponin T Reference Range:  <14.0 ng/L- Negative Female for AMI  <22.0 ng/L- Negative Male for AMI  >=14 - Abnormal Female indicating possible myocardial injury.  >=22 - Abnormal Male indicating possible myocardial injury.   Clinicians would have to utilize clinical acumen, EKG, Troponin, and serial changes to determine if it is an Acute Myocardial Infarction or myocardial injury due to an underlying chronic condition.         CBC & Differential [[id number]]  (Abnormal) Collected: 03/06/24 0551    Specimen: Blood Updated: 03/06/24 0616    Narrative:      The following orders were created for panel order CBC & Differential.  Procedure                               Abnormality         Status                     ---------                               -----------         ------                     CBC Auto Differential[[id number]]        Abnormal            Final result                 Please view results for these tests on the individual orders.    CBC Auto Differential [[id number]]  (Abnormal) Collected: 03/06/24 0551    Specimen: Blood Updated: 03/06/24 0616     WBC 7.20 10*3/mm3      RBC 4.42 10*6/mm3      Hemoglobin 13.0 g/dL      Hematocrit 39.9 %      MCV 90.4 fL      MCH 29.4 pg      MCHC 32.5 g/dL      RDW 15.1 %      RDW-SD 47.7 fl      MPV 7.6 fL      Platelets 264 10*3/mm3      Neutrophil % 44.7 %      Lymphocyte % 29.7 %      Monocyte % 9.6 %      Eosinophil % 13.9 %      Basophil % 2.1 %       Neutrophils, Absolute 3.20 10*3/mm3      Lymphocytes, Absolute 2.10 10*3/mm3      Monocytes, Absolute 0.70 10*3/mm3      Eosinophils, Absolute 1.00 10*3/mm3      Basophils, Absolute 0.20 10*3/mm3      nRBC 0.1 /100 WBC     High Sensitivity Troponin T 2Hr [[id number]]  (Abnormal) Collected: 03/05/24 1853    Specimen: Blood Updated: 03/05/24 1920     HS Troponin T 102 ng/L      Troponin T Delta -15 ng/L     Narrative:      High Sensitive Troponin T Reference Range:  <14.0 ng/L- Negative Female for AMI  <22.0 ng/L- Negative Male for AMI  >=14 - Abnormal Female indicating possible myocardial injury.  >=22 - Abnormal Male indicating possible myocardial injury.   Clinicians would have to utilize clinical acumen, EKG, Troponin, and serial changes to determine if it is an Acute Myocardial Infarction or myocardial injury due to an underlying chronic condition.         Dorsey Draw [[id number]] Collected: 03/05/24 1644    Specimen: Blood Updated: 03/05/24 1745    Narrative:      The following orders were created for panel order Dorsey Draw.  Procedure                               Abnormality         Status                     ---------                               -----------         ------                     Green Top (Gel)[[id number]]                                  Final result               Lavender Top[[id number]]                                     Final result               Gold Top - SST[[id number]]                                   Final result               Light Blue Top[[id number]]                                   Final result                 Please view results for these tests on the individual orders.    Lavender Top [[id number]] Collected: 03/05/24 1644    Specimen: Blood Updated: 03/05/24 1745     Extra Tube hold for add-on     Comment: Auto resulted       Light Blue Top [[id number]] Collected: 03/05/24 1644    Specimen: Blood Updated: 03/05/24 1745     Extra Tube Hold for add-ons.     Comment: Auto resulted        Green Top (Gel) [[id number]] Collected: 03/05/24 1644    Specimen: Blood Updated: 03/05/24 1714     Extra Tube hold    Single High Sensitivity Troponin T [[id number]]  (Abnormal) Collected: 03/05/24 1644    Specimen: Blood Updated: 03/05/24 1711     HS Troponin T 117 ng/L     Narrative:      High Sensitive Troponin T Reference Range:  <14.0 ng/L- Negative Female for AMI  <22.0 ng/L- Negative Male for AMI  >=14 - Abnormal Female indicating possible myocardial injury.  >=22 - Abnormal Male indicating possible myocardial injury.   Clinicians would have to utilize clinical acumen, EKG, Troponin, and serial changes to determine if it is an Acute Myocardial Infarction or myocardial injury due to an underlying chronic condition.         Comprehensive Metabolic Panel [[id number]]  (Abnormal) Collected: 03/05/24 1644    Specimen: Blood Updated: 03/05/24 1710     Glucose 119 mg/dL      BUN 9 mg/dL      Creatinine 1.07 mg/dL      Sodium 140 mmol/L      Potassium 4.0 mmol/L      Chloride 101 mmol/L      CO2 29.0 mmol/L      Calcium 10.2 mg/dL      Total Protein 7.6 g/dL      Albumin 4.1 g/dL      ALT (SGPT) 9 U/L      AST (SGOT) 28 U/L      Alkaline Phosphatase 87 U/L      Total Bilirubin 0.6 mg/dL      Globulin 3.5 gm/dL      A/G Ratio 1.2 g/dL      BUN/Creatinine Ratio 8.4     Anion Gap 10.0 mmol/L      eGFR 72.4 mL/min/1.73     Narrative:      GFR Normal >60  Chronic Kidney Disease <60  Kidney Failure <15    The GFR formula is only valid for adults with stable renal function between ages 18 and 70.    Protime-INR [[id number]]  (Normal) Collected: 03/05/24 1644    Specimen: Blood Updated: 03/05/24 1705     Protime 11.5 Seconds      INR 1.06    aPTT [[id number]]  (Normal) Collected: 03/05/24 1644    Specimen: Blood Updated: 03/05/24 1705     PTT 28.0 seconds              Imaging Results (Last 24 Hours)       Procedure Component Value Units Date/Time    MRI Brain Without Contrast [[id number]] Collected: 03/06/24 1005     Updated:  03/06/24 1020    Narrative:      MRI BRAIN WO CONTRAST    Date of Exam: 3/6/2024 9:25 AM EST    Indication: Transient ischemic attack (TIA).     Comparison: CT stroke study 3/5/2024    Technique:  Routine multiplanar/multisequence sequence images of the brain were obtained without contrast administration.      Findings:  Punctate areas of increased diffusion weighted signal are seen in the left occipital lobe (series 18 images 44 through 46) without definite decreased ADC. There is corresponding FLAIR hyperintensity in these regions.    There is no evidence of acute or chronic intracranial hemorrhage. No mass effect or midline shift. No abnormal extra-axial collections.     The basal ganglia, brainstem and cerebellum appear within normal limits. Midline structures are intact. There is mild age-related atrophy. Mild to moderate scattered subcortical and periventricular T2/FLAIR white matter hyperintensities likely reflect   the sequela of chronic microvascular ischemic disease.    Calvarial signal is within normal limits. There is a 1.5 cm left scalp lesion which may represent a sebaceous cyst. Orbits appear unremarkable. The paranasal sinuses and the mastoid air cells appear well aerated.         Impression:      Impression:    1. Punctate foci of increased diffusion weighted and FLAIR signal in the left occipital lobe without definite decreased ADC signal, findings are suggestive of punctate early subacute infarcts within the left PCA territory.   2. Mild age-related atrophy with mild to moderate chronic microvascular ischemic changes.      Electronically Signed: Lynn Angel MD    3/6/2024 10:18 AM EST    Workstation ID: [id number]    CT Angiogram Head w AI Analysis of LVO [[id number]] Collected: 03/05/24 1800     Updated: 03/05/24 1816    Narrative:      CT ANGIOGRAM HEAD W AI ANALYSIS OF LVO, CT ANGIOGRAM NECK    Date of Exam: 3/5/2024 5:38 PM EST    Indication: Stroke, follow up  Neuro deficit, acute stroke  suspected  Acute Stroke.    Comparison: Same day head CT    Technique: CTA of the head and neck was performed after the uneventful intravenous administration of iodinated contrast. Reconstructed coronal and sagittal images were also obtained. In addition, a 3-D volume rendered image was created for   interpretation. Automated exposure control and iterative reconstruction methods were used.      Findings:  Contrast opacification: Excellent    Aortic Arch: The visualized ascending thoracic aorta is ectatic measuring up to 4.2 cm. The descending thoracic aorta is aneurysmal measuring up to 3.6 cm. Otherwise unremarkable    Right:    Innominate: Patent. Evaluation of the origin is significantly degraded due to streak artifact.  Subclavian: Patent  Common Carotid: Evaluation of the origin is nondiagnostic due to significant streak artifact. Otherwise unremarkable  External Carotid:Patent  Internal Carotid: Patent    Intracranial ICA: Extensive vascular calcifications with possible mild stenosis within the supraclinoid segment. Otherwise patent.  M2:: Multifocal moderate stenoses noted within the right M2 segment. Additional areas of severe stenosis to near occlusion noted within the distal right M2/M3 segments (for example image 673 of series 4)..  ERIN: Multifocal mild stenoses. Otherwise  PCA: Mild to moderate stenosis noted within the distal P2 segment. Otherwise unremarkable  Vertebral: Patent    Left:    Subclavian: Patent  Common Carotid: Patent  External Carotid:Patent  Internal Carotid: Patent    Intracranial ICA: Extensive vascular calcifications with possible mild stenosis within the supraclinoid segment. Otherwise patent  MCA:Patent  ERIN: Mild to moderate stenoses of the distal ERIN.  PCA: Severe stenosis to near complete occlusion of the distal left P1 segment/proximal P2 segment (image 629 of series 4). Distally the PCA arteries patent. Additional focal areas of mild stenoses are noted within the distal P2/P3  segments.  Vertebral: Patent      Basilar: Patent      Soft Tissue: No abnormal acute intracranial enhancement.  The visualized soft tissues of the head and neck are grossly unremarkable.  Lungs: Unremarkable  Bones: No acute osseous abnormality. Multilevel degenerative changes throughout the visualized spine.      Impression:      Impression:  A few focal areas of severe stenosis to near occlusion noted within the distal right M2/M3 segments as characterized above. Additionally there is severe stenosis to near occlusion of the distal left P1 segment/P2 segment, although the PCA is patent more   distally. Please consider follow-up with dedicated MRI of the brain to exclude an acute infarct.    Additional mild to moderate intracranial stenoses as characterized above.    No significant stenoses within the neck.    Incidentally noted ectasia of the ascending thoracic aorta and aneurysmal dilation of the descending thoracic aorta.        Electronically Signed: Pablito Childers DO    3/5/2024 6:14 PM EST    Workstation ID: [id number]    CT Angiogram Neck [[id number]] Collected: 03/05/24 1800     Updated: 03/05/24 1816    Narrative:      CT ANGIOGRAM HEAD W AI ANALYSIS OF LVO, CT ANGIOGRAM NECK    Date of Exam: 3/5/2024 5:38 PM EST    Indication: Stroke, follow up  Neuro deficit, acute stroke suspected  Acute Stroke.    Comparison: Same day head CT    Technique: CTA of the head and neck was performed after the uneventful intravenous administration of iodinated contrast. Reconstructed coronal and sagittal images were also obtained. In addition, a 3-D volume rendered image was created for   interpretation. Automated exposure control and iterative reconstruction methods were used.      Findings:  Contrast opacification: Excellent    Aortic Arch: The visualized ascending thoracic aorta is ectatic measuring up to 4.2 cm. The descending thoracic aorta is aneurysmal measuring up to 3.6 cm. Otherwise  unremarkable    Right:    Innominate: Patent. Evaluation of the origin is significantly degraded due to streak artifact.  Subclavian: Patent  Common Carotid: Evaluation of the origin is nondiagnostic due to significant streak artifact. Otherwise unremarkable  External Carotid:Patent  Internal Carotid: Patent    Intracranial ICA: Extensive vascular calcifications with possible mild stenosis within the supraclinoid segment. Otherwise patent.  M2:: Multifocal moderate stenoses noted within the right M2 segment. Additional areas of severe stenosis to near occlusion noted within the distal right M2/M3 segments (for example image 673 of series 4)..  ERIN: Multifocal mild stenoses. Otherwise  PCA: Mild to moderate stenosis noted within the distal P2 segment. Otherwise unremarkable  Vertebral: Patent    Left:    Subclavian: Patent  Common Carotid: Patent  External Carotid:Patent  Internal Carotid: Patent    Intracranial ICA: Extensive vascular calcifications with possible mild stenosis within the supraclinoid segment. Otherwise patent  MCA:Patent  ERIN: Mild to moderate stenoses of the distal ERIN.  PCA: Severe stenosis to near complete occlusion of the distal left P1 segment/proximal P2 segment (image 629 of series 4). Distally the PCA arteries patent. Additional focal areas of mild stenoses are noted within the distal P2/P3 segments.  Vertebral: Patent      Basilar: Patent      Soft Tissue: No abnormal acute intracranial enhancement.  The visualized soft tissues of the head and neck are grossly unremarkable.  Lungs: Unremarkable  Bones: No acute osseous abnormality. Multilevel degenerative changes throughout the visualized spine.      Impression:      Impression:  A few focal areas of severe stenosis to near occlusion noted within the distal right M2/M3 segments as characterized above. Additionally there is severe stenosis to near occlusion of the distal left P1 segment/P2 segment, although the PCA is patent more   distally.  Please consider follow-up with dedicated MRI of the brain to exclude an acute infarct.    Additional mild to moderate intracranial stenoses as characterized above.    No significant stenoses within the neck.    Incidentally noted ectasia of the ascending thoracic aorta and aneurysmal dilation of the descending thoracic aorta.        Electronically Signed: Pablito Childers DO    3/5/2024 6:14 PM EST    Workstation ID: [id number]    CT Head Without Contrast Stroke Protocol [[id number]] Collected: 03/05/24 1745     Updated: 03/05/24 1754    Narrative:      CT HEAD WO CONTRAST STROKE PROTOCOL    Date of Exam: 3/5/2024 5:34 PM EST    Indication: Neuro deficit, acute stroke suspected  Neuro deficit, acute, stroke suspected.    Comparison: None available.    Technique: Axial CT images were obtained of the head without contrast administration.  Reconstructed coronal and sagittal images were also obtained. Automated exposure control and iterative construction methods were used.    Scan Time: 1739  Results discussed with Dr. Krishna  at 548 on 3/5/2024.    Findings: No intracranial hemorrhage. Gray-white matter differentiation is maintained without evidence of an acute infarction. Multiple foci of decreased attenuation are present within the subcortical, deep cerebral, and periventricular white matter   consistent with chronic small vessel/microangiopathic ischemic changes. No extra-axial mass or collection. The ventricles and sulci are prominent commensurate with involutional changes. The posterior fossa appears grossly normal. Sellar and suprasellar   structures are normal.    Orbital and periorbital soft tissues are normal. The paranasal sinuses, ethmoid air cells, and mastoid air cells are aerated. The bony calvarium is intact. Sessile 1.5 cm x 0.3 cm left frontal scalp lesion.      Impression:      Impression: No acute intracranial pathology.        Electronically Signed: Siddhartha Hidalgo MD    3/5/2024 5:50 PM EST     Workstation ID: [id number]    XR Chest 1 View [[id number]] Collected: 03/05/24 1726     Updated: 03/05/24 1729    Narrative:      XR CHEST 1 VW    Date of Exam: 3/5/2024 5:17 PM EST    Indication: Acute Stroke Protocol (onset < 12 hrs)    Comparison: None available.    Findings:  The heart looks enlarged. The lungs seem clear. There are no pleural effusions.      Impression:      Impression:  1.Cardiomegaly.      Electronically Signed: Eladio Barron MD    3/5/2024 5:26 PM EST    Workstation ID: [id number]                 I reviewed the patient's new clinical results.    Medication Review:   Scheduled Meds:aspirin, 81 mg, Oral, Daily  clopidogrel, 75 mg, Oral, Daily  famotidine, 40 mg, Oral, Daily  [START ON 3/7/2024] losartan, 100 mg, Oral, Q24H   And  [START ON 3/7/2024] hydroCHLOROthiazide, 12.5 mg, Oral, Q24H  latanoprost, 1 drop, Both Eyes, Nightly  povidone-iodine, , Topical, Daily  [START ON 3/7/2024] rosuvastatin, 20 mg, Oral, Daily  sodium chloride, 10 mL, Intravenous, Q12H      Continuous Infusions:   PRN Meds:.  acetaminophen    senna-docusate sodium **AND** polyethylene glycol **AND** bisacodyl **AND** bisacodyl    Calcium Replacement - Follow Nurse / BPA Driven Protocol    hydrALAZINE    Magnesium Standard Dose Replacement - Follow Nurse / BPA Driven Protocol    nitroglycerin    ondansetron    Phosphorus Replacement - Follow Nurse / BPA Driven Protocol    Potassium Replacement - Follow Nurse / BPA Driven Protocol    sodium chloride    sodium chloride    sodium chloride     Assessment & Plan       TIA (transient ischemic attack)  - CT head:  no acute findings  - MRI brain: Subacute strokes within the left PCA territory. - -CTA, patient has severe stenosis, possible occlusion of the left PCA likely 2/2 atherosclerotic disease.   -  Echo: pending   - EKG: SR,, prolong [place] interval   - Labs: A1C: 5.90, B12: P, LDL: 50, TSH: 2.890  - started on ASA 81 mg and Plavix 75 mg daily   - Crestor 20mg QD  - PT/OT  -  neurology following    HTN  - increased losartan to 100mg QD, continue HCTZ  HLD  - statin    DVT prophylaxis- SCD's  GI prophylaxis- ppi    Plan for disposition:home soon    Ashley Root MD  03/06/24  17:03 EST

## 2024-03-06 NOTE — THERAPY EVALUATION
Patient Name: Roger Lambert  : 1950    MRN: [id number]                              Today's Date: 3/6/2024       Admit Date: 3/5/2024    Visit Dx:     ICD-10-CM ICD-9-CM   1. TIA (transient ischemic attack)  G45.9 435.9   2. Elevated troponin  R79.89 790.6     Patient Active Problem List   Diagnosis    TIA (transient ischemic attack)     History reviewed. No pertinent past medical history.  History reviewed. No pertinent surgical history.   General Information       Row Name 24 [facility]          Physical Therapy Time and Intention    Document Type evaluation  -SS (r) BH (t) SS (c)     Mode of Treatment physical therapy  -SS (r) BH (t) SS (c)       Row Name 24 [facility]          General Information    Patient Profile Reviewed yes  -SS (r) BH (t) SS (c)     Prior Level of Function independent:;all household mobility;community mobility;ADL's;driving  -SS (r) BH (t) SS (c)     Existing Precautions/Restrictions no known precautions/restrictions  -SS (r) BH (t) SS (c)       Row Name 24 1324          Living Environment    People in Home alone  -SS (r) BH (t) SS (c)       Row Name 24 1324          Home Main Entrance    Number of Stairs, Main Entrance three  -SS (r) BH (t) SS (c)     Stair Railings, Main Entrance none  -SS (r) BH (t) SS (c)       Row Name 24 1324          Stairs Within Home, Primary    Number of Stairs, Within Home, Primary none  -SS (r) BH (t) SS (c)       Row Name 24 1324          Cognition    Orientation Status (Cognition) oriented x 4  -SS (r) BH (t) SS (c)       Row Name 24 1324          Safety Issues, Functional Mobility    Impairments Affecting Function (Mobility) endurance/activity tolerance;strength  -SS (r) BH (t) SS (c)               User Key  (r) = Recorded By, (t) = Taken By, (c) = Cosigned By      Initials Name Provider Type    SS Tamara Gibson, PT Physical Therapist    Jeanine Neves PT Student PT Student                   Mobility        Row Name 03/06/24 1326          Bed Mobility    Bed Mobility bed mobility (all) activities  -SS (r) BH (t) SS (c)     All Activities, Hale (Bed Mobility) supervision  -SS (r) BH (t) SS (c)     Assistive Device (Bed Mobility) bed rails;head of bed elevated  -SS (r) BH (t) SS (c)       Row Name 03/06/24 1326          Sit-Stand Transfer    Sit-Stand Hale (Transfers) contact guard  -SS (r) BH (t) SS (c)       Row Name 03/06/24 1326          Gait/Stairs (Locomotion)    Hale Level (Gait) contact guard;standby assist  -SS (r) BH (t) SS (c)     Patient was able to Ambulate yes  -SS (r) BH (t) SS (c)     Distance in Feet (Gait) 50  -SS (r) BH (t) SS (c)     Deviations/Abnormal Patterns (Gait) base of support, narrow  -SS (r) BH (t) SS (c)               User Key  (r) = Recorded By, (t) = Taken By, (c) = Cosigned By      Initials Name Provider Type    SS Tamara Gibson, PT Physical Therapist     Jeanine Cueto PT Student PT Student                   Obj/Interventions       Row Name 03/06/24 1327          Range of Motion Comprehensive    General Range of Motion no range of motion deficits identified  -SS (r) BH (t) SS (c)       Row Name 03/06/24 1327          Strength Comprehensive (MMT)    General Manual Muscle Testing (MMT) Assessment lower extremity strength deficits identified  -SS (r) BH (t) SS (c)     Comment, General Manual Muscle Testing (MMT) Assessment R hip 3+/5  -SS (r) BH (t) SS (c)       Row Name 03/06/24 1327          Balance    Balance Assessment sitting static balance;sitting dynamic balance;sit to stand dynamic balance;standing static balance  -SS (r) BH (t) SS (c)     Static Sitting Balance supervision  -SS (r) BH (t) SS (c)     Dynamic Sitting Balance supervision  -SS (r) BH (t) SS (c)     Position, Sitting Balance unsupported;sitting edge of bed  -SS (r) BH (t) SS (c)     Sit to Stand Dynamic Balance contact guard  -SS (r) BH (t) SS (c)     Static Standing Balance standby  assist  -SS (r) BH (t) SS (c)       Row Name 03/06/24 1327          Sensory Assessment (Somatosensory)    Sensory Assessment (Somatosensory) sensation intact  -SS (r) BH (t) SS (c)               User Key  (r) = Recorded By, (t) = Taken By, (c) = Cosigned By      Initials Name Provider Type    SS Tamara Gibson, PT Physical Therapist     Jeanine Cueto, PT Student PT Student                   Goals/Plan       Row Name 03/06/24 1340          Bed Mobility Goal 1 (PT)    Activity/Assistive Device (Bed Mobility Goal 1, PT) bed mobility activities, all  -SS (r) BH (t) SS (c)     Darling Level/Cues Needed (Bed Mobility Goal 1, PT) independent  -SS (r) BH (t) SS (c)     Time Frame (Bed Mobility Goal 1, PT) long term goal (LTG);2 weeks  -SS (r) BH (t) SS (c)       Row Name 03/06/24 1340          Transfer Goal 1 (PT)    Activity/Assistive Device (Transfer Goal 1, PT) transfers, all  -SS (r) BH (t) SS (c)     Darling Level/Cues Needed (Transfer Goal 1, PT) independent  -SS (r) BH (t) SS (c)     Time Frame (Transfer Goal 1, PT) long term goal (LTG);2 weeks  -SS (r) BH (t) SS (c)       Row Name 03/06/24 1340          Gait Training Goal 1 (PT)    Activity/Assistive Device (Gait Training Goal 1, PT) gait (walking locomotion)  -SS (r) BH (t) SS (c)     Darling Level (Gait Training Goal 1, PT) independent  -SS (r) BH (t) SS (c)     Distance (Gait Training Goal 1, PT) 100 ft  -SS (r) BH (t) SS (c)     Time Frame (Gait Training Goal 1, PT) long term goal (LTG);2 weeks  -SS (r) BH (t) SS (c)       Row Name 03/06/24 1340          Therapy Assessment/Plan (PT)    Planned Therapy Interventions (PT) balance training;gait training;transfer training;strengthening;bed mobility training  -SS (r) BH (t) SS (c)               User Key  (r) = Recorded By, (t) = Taken By, (c) = Cosigned By      Initials Name Provider Type    SS Tamara Gibson, PT Physical Therapist    Jeanine Neves, PT Student PT Student                    Clinical Impression       Row Name 24 1328          Pain    Pretreatment Pain Rating 0/10 - no pain  -SS (r) BH (t) SS (c)     Posttreatment Pain Rating 0/10 - no pain  -SS (r) BH (t) SS (c)       Row Name 24 1328          Plan of Care Review    Plan of Care Reviewed With patient;sibling  -SS (r) BH (t) SS (c)     Outcome Evaluation Pt is a 74 y/o male with an unkown PMH, presented to the ER with reports of right arm and leg weakness, and blurry vision 3/4/24 when he was sitting in the recliner that resolved after a few minutes. He reports 3/5/24 around noon he had the same symptoms but did not have blurry vision. Per neurology pt had Subacute stroke within the left PCA territory. Pt stated he had just travelled for 13 hours for a  prior to onset of symptoms. PLOF was indep with ADLs, driving, and mobility w/o AD. Pt lives alone in a [facility] with 3 EMELY. Pt states he's had 1 fall in the past 6 months Today pt required CGA with ambulation with no AD, narrow DEIRDRE. Pt hypertensive at rest 166/87, in sitting at 173/87, standing 166/94, and after activity 180/109. After returned to supine BP decreased to 155/74. All other vitals WNL. Vision testing including convergence and visual field was normal. Coordination testing of upper and lower extremities was normal. Due to pt presentation, recommending return to home with assist from family nearby as needed. PT will follow until d/c.  -SS (r) BH (t) SS (c)       Row Name 24 1328          Therapy Assessment/Plan (PT)    Rehab Potential (PT) good, to achieve stated therapy goals  -SS (r) BH (t) SS (c)     Criteria for Skilled Interventions Met (PT) yes  -SS (r) BH (t) SS (c)     Therapy Frequency (PT) 3 times/wk  -SS (r) BH (t) SS (c)       Row Name 24 1328          Vital Signs    Pre Systolic BP Rehab 166  -SS (r) BH (t) SS (c)     Pre Treatment Diastolic BP 87  -SS (r) BH (t) SS (c)     Intra Systolic BP Rehab 180  -SS (r) BH (t) SS (c)      Intra Treatment Diastolic   -SS (r) BH (t) SS (c)     Post Systolic BP Rehab 155  -SS (r) BH (t) SS (c)     Post Treatment Diastolic BP 74  -SS (r) BH (t) SS (c)     Pretreatment Heart Rate (beats/min) 62  -SS (r) BH (t) SS (c)     Pre SpO2 (%) 98  -SS (r) BH (t) SS (c)     O2 Delivery Pre Treatment room air  -SS (r) BH (t) SS (c)     Pre Patient Position Sitting  -SS (r) BH (t) SS (c)     Intra Patient Position Standing  -SS (r) BH (t) SS (c)     Post Patient Position Supine  -SS (r) BH (t) SS (c)       Row Name 03/06/24 1328          Positioning and Restraints    Pre-Treatment Position in bed  -SS (r) BH (t) SS (c)     Post Treatment Position bed  -SS (r) BH (t) SS (c)     In Bed notified nsg;fowlers;call light within reach;with family/caregiver  -SS (r) BH (t) SS (c)               User Key  (r) = Recorded By, (t) = Taken By, (c) = Cosigned By      Initials Name Provider Type    SS Tamara Gibson, PT Physical Therapist    Jeanine Neves, PT Student PT Student                   Outcome Measures       Row Name 03/06/24 1508 03/06/24 1342       Modified [place] Scale    Pre-Stroke Modified [place] Scale 0 - No Symptoms at all.  - 3 - Moderate disability.  Requiring some help, but able to walk without assistance.  -SS (r) BH (t) SS (c)    Modified [place] Scale 2 - Slight disability.  Unable to carry out all previous activities but able to look after own affairs without assistance.  - --      Row Name 03/06/24 1508 03/06/24 1342       Functional Assessment    Outcome Measure Options Modified [place]  - Modified [place]  -SS (r) BH (t) SS (c)              User Key  (r) = Recorded By, (t) = Taken By, (c) = Cosigned By      Initials Name Provider Type    SS Tamara Gibson, PT Physical Therapist     June Vásquez, OT Occupational Therapist     Jeanine Cueto, PT Student PT Student                                 Physical Therapy Education       Title: PT OT SLP Therapies (Done)       Topic:  Physical Therapy (Done)       Point: Mobility training (Done)       Learning Progress Summary             Patient Acceptance, E, VU by  at 3/6/2024 1342   Family Acceptance, E, VU by  at 3/6/2024 134                         Point: Body mechanics (Done)       Learning Progress Summary             Patient Acceptance, E, VU by  at 3/6/2024 1342   Family Acceptance, E, VU by  at 3/6/2024 134                         Point: Precautions (Done)       Learning Progress Summary             Patient Acceptance, E, VU by  at 3/6/2024 1342   Family Acceptance, E, VU by  at 3/6/2024 1342                                         User Key       Initials Effective Dates Name Provider Type Discipline     01/15/24 -  Jeanine Cueto, WILLY Student PT Student PT                  PT Recommendation and Plan  Planned Therapy Interventions (PT): balance training, gait training, transfer training, strengthening, bed mobility training  Plan of Care Reviewed With: patient, sibling  Outcome Evaluation: Pt is a 72 y/o male with an unkown PMH, presented to the ER with reports of right arm and leg weakness, and blurry vision 3/4/24 when he was sitting in the recliner that resolved after a few minutes. He reports 3/5/24 around noon he had the same symptoms but did not have blurry vision. Per neurology pt had Subacute stroke within the left PCA territory. Pt stated he had just travelled for 13 hours for a  prior to onset of symptoms. PLOF was indep with ADLs, driving, and mobility w/o AD. Pt lives alone in a [facility] with 3 EMELY. Pt states he's had 1 fall in the past 6 months Today pt required CGA with ambulation with no AD, narrow DEIRDRE. Pt hypertensive at rest 166/87, in sitting at 173/87, standing 166/94, and after activity 180/109. After returned to supine BP decreased to 155/74. All other vitals WNL. Vision testing including convergence and visual field was normal. Coordination testing of upper and lower extremities was normal. Due  to pt presentation, recommending return to home with assist from family nearby as needed. PT will follow until d/c.     Time Calculation:   PT Evaluation Complexity  History, PT Evaluation Complexity: 3 or more personal factors and/or comorbidities  Examination of Body Systems (PT Eval Complexity): total of 3 or more elements  Clinical Presentation (PT Evaluation Complexity): evolving  Clinical Decision Making (PT Evaluation Complexity): moderate complexity  Overall Complexity (PT Evaluation Complexity): moderate complexity     PT Charges       Row Name 03/06/24 1343             Time Calculation    Start Time 1203  -SS (r) BH (t) SS (c)      Stop Time 1235  -SS (r) BH (t) SS (c)      Time Calculation (min) 32 min  -SS (r) BH (t)      PT Received On 03/06/24  -SS (r) BH (t) SS (c)      PT - Next Appointment 03/08/24  -SS (r) BH (t) SS (c)      PT Goal Re-Cert Due Date 03/20/24  -SS (r) BH (t) SS (c)         Time Calculation- PT    Total Timed Code Minutes- PT 0 minute(s)  -SS (r) BH (t) SS (c)                User Key  (r) = Recorded By, (t) = Taken By, (c) = Cosigned By      Initials Name Provider Type    SS Tamara Gibson, PT Physical Therapist    Jeanine Neves, PT Student PT Student                  Therapy Charges for Today       Code Description Service Date Service Provider Modifiers Qty    [id number]  PT EVAL MOD COMPLEXITY 4 3/6/2024 Jeanine Cueto, PT Student GP 1            PT G-Codes  Outcome Measure Options: Modified [place]  AM-PAC 6 Clicks Score (PT): 24  Modified [place] Scale: 2 - Slight disability.  Unable to carry out all previous activities but able to look after own affairs without assistance.  PT Discharge Summary  Anticipated Discharge Disposition (PT): home with assist    Jeanine Cueto PT Student  3/6/2024

## 2024-03-06 NOTE — CONSULTS
"Diabetes Education  Assessment/Teaching    Patient Name:  Roger Lambert  YOB: 1950  MRN: [id number]  Admit Date:  3/5/2024      Assessment Date:  3/6/2024  Flowsheet Row Most Recent Value   General Information     Height 182.9 cm (72\")   Weight 124 kg (273 lb)   Pregnancy Assessment    Diabetes History    Education Preferences    Nutrition Information    Assessment Topics    DM Goals                   Other Comments:  MD consult per stroke protocol. On 3/5/2024 A1c was 5.9%. A1c info sheet given with discussion on being in pre-diabetes range. Handout given with discussion on pre-diabetes. [place] pamphlet given with offering of classes. Patient stated he hadn't been getting much exercise due to taking care of his wife who just  2024. Sister at bedside and stated that maybe they could start exercising together. Recommended to patient to try and drink unsweetened beverages. Patient has no further questions or concerns related to diabetes at this time.        Electronically signed by:  Anne Reyes RN  24 14:55 EST    "

## 2024-03-06 NOTE — CASE MANAGEMENT/SOCIAL WORK
Continued Stay Note  VIVIAN Melo     Patient Name: Roger Lambert  MRN: [id number]  Today's Date: 3/6/2024    Admit Date: 3/5/2024    Plan: Home, Declined Home health   Discharge Plan       Row Name 03/06/24 1626       Plan    Plan Home, Declined Home health    Plan Comments Met with patient to discuss Home health choices patient declined home health at this time stated that he has a PCP appt on friday and if he changes his mind he will follow up with PCP for home health at that time. Family member also at bedside and acknowledged. DB barriers: neuro following, echo pending                        Nayely Irizarry, RN

## 2024-03-06 NOTE — PLAN OF CARE
Goal Outcome Evaluation:  Plan of Care Reviewed With: patient, sibling        Progress: improving  Outcome Evaluation: Pt is 74 y/o M admitted with rt arm numbness and transient vision blurriness. Pt reports he fell asleep in his recliner and woke up feeling like he had slept on his arm. His acute symptoms are gone now per his report but his BP is very high. Pt did not require physical assist this date to get up, put on his socks, shoes, and walk in the room. He did demonstrate mild cognitive deficit. He is grieving the loss of his spouse last week. Pt is found on CT and MRI scans to have had punctuate subacute PCA teritory events and his left carotid is severely stenotic and may be fully blocked. Cardiology is consulted. Assuming pt may have permissive HTN so allowed him to walk around a little and he was asymptomatic except for fatigue. Pt laid back down and RN was notified. Current OT d/c recomendation is home w/ HH but will need to continue to assess pt function as he is treated by his medical team.      Anticipated Discharge Disposition (OT): home with home health

## 2024-03-06 NOTE — H&P
Patient Care Team:  Abel Perez MD as PCP - General (Family Medicine)    Chief complaint right arm and leg weakness    Subjective     Patient is a 73 y.o. male who presented to the ER with reports of right arm and leg weakness, and blurry vision yesterday evening when he was sitting in the recliner that resolved after a few minutes. He reports  today around noon he had the same symptoms but did not have blurry vision. He states his left arm and leg felt numb and tingly. He denies any headache, fever, chills, dizziness, chest pain, shortness of breath. Upon arrival to the ER he reported that his leg symptoms had resolved but still had some tingling in his right arm.   In the ER troponin 117, EKG sinus rhythm, CXR shows cardiomegaly, CT head shows no acute findings, CTA's shows a few focal areas of stenosis, recommending MRI follow up. Neurology was consulted and recommended starting patient on Plavix and aspirin.       Onset of symptoms was 1 day    Review of Systems   HENT: Negative.     Eyes: Negative.    Respiratory: Negative.     Cardiovascular: Negative.    Gastrointestinal: Negative.    Endocrine: Negative.    Musculoskeletal: Negative.    Skin: Negative.    Neurological:  Positive for weakness and numbness.   Psychiatric/Behavioral: Negative.            History  History reviewed. No pertinent past medical history.  History reviewed. No pertinent surgical history.  History reviewed. No pertinent family history.  Social History     Tobacco Use    Smoking status: Never    Smokeless tobacco: Never   Vaping Use    Vaping status: Never Used   Substance Use Topics    Alcohol use: Never    Drug use: Never     (Not in a hospital admission)    Allergies:  Patient has no known allergies.    Objective     Vital Signs  Temp:  [98.7 °F (37.1 °C)] 98.7 °F (37.1 °C)  Heart Rate:  [60-82] 60  Resp:  [12-20] 14  BP: (129-174)/(74-85) 142/85     Physical Exam:      General Appearance:    Alert, cooperative, in no acute  distress   Head:    Normocephalic, without obvious abnormality, atraumatic   Eyes:            Lids and lashes normal, conjunctivae and sclerae normal, no   icterus, no pallor, corneas clear, PERRLA   Ears:    Ears appear intact with no abnormalities noted   Throat:   No oral lesions, no thrush, oral mucosa moist   Neck:   No adenopathy, supple, trachea midline, no thyromegaly, no   carotid bruit, no JVD   Lungs:     Clear to auscultation,respirations regular, even and                  unlabored    Heart:    Regular rhythm and normal rate, normal S1 and S2, no            murmur, no gallop, no rub, no click   Chest Wall:    No abnormalities observed   Abdomen:     Normal bowel sounds, no masses, no organomegaly, soft        non-tender, non-distended, no guarding, no rebound                tenderness   Extremities:   Moves all extremities well, no edema, no cyanosis, no             redness   Pulses:   Pulses palpable and equal bilaterally   Skin:   No bleeding, bruising or rash   Lymph nodes:   No palpable adenopathy   Neurologic:   No focal deficits noted       Results Review:     Imaging Results (Last 24 Hours)       Procedure Component Value Units Date/Time    CT Angiogram Head w AI Analysis of LVO [[id number]] Collected: 03/05/24 1800     Updated: 03/05/24 1816    Narrative:      CT ANGIOGRAM HEAD W AI ANALYSIS OF LVO, CT ANGIOGRAM NECK    Date of Exam: 3/5/2024 5:38 PM EST    Indication: Stroke, follow up  Neuro deficit, acute stroke suspected  Acute Stroke.    Comparison: Same day head CT    Technique: CTA of the head and neck was performed after the uneventful intravenous administration of iodinated contrast. Reconstructed coronal and sagittal images were also obtained. In addition, a 3-D volume rendered image was created for   interpretation. Automated exposure control and iterative reconstruction methods were used.      Findings:  Contrast opacification: Excellent    Aortic Arch: The visualized ascending  thoracic aorta is ectatic measuring up to 4.2 cm. The descending thoracic aorta is aneurysmal measuring up to 3.6 cm. Otherwise unremarkable    Right:    Innominate: Patent. Evaluation of the origin is significantly degraded due to streak artifact.  Subclavian: Patent  Common Carotid: Evaluation of the origin is nondiagnostic due to significant streak artifact. Otherwise unremarkable  External Carotid:Patent  Internal Carotid: Patent    Intracranial ICA: Extensive vascular calcifications with possible mild stenosis within the supraclinoid segment. Otherwise patent.  M2:: Multifocal moderate stenoses noted within the right M2 segment. Additional areas of severe stenosis to near occlusion noted within the distal right M2/M3 segments (for example image 673 of series 4)..  ERIN: Multifocal mild stenoses. Otherwise  PCA: Mild to moderate stenosis noted within the distal P2 segment. Otherwise unremarkable  Vertebral: Patent    Left:    Subclavian: Patent  Common Carotid: Patent  External Carotid:Patent  Internal Carotid: Patent    Intracranial ICA: Extensive vascular calcifications with possible mild stenosis within the supraclinoid segment. Otherwise patent  MCA:Patent  ERIN: Mild to moderate stenoses of the distal ERIN.  PCA: Severe stenosis to near complete occlusion of the distal left P1 segment/proximal P2 segment (image 629 of series 4). Distally the PCA arteries patent. Additional focal areas of mild stenoses are noted within the distal P2/P3 segments.  Vertebral: Patent      Basilar: Patent      Soft Tissue: No abnormal acute intracranial enhancement.  The visualized soft tissues of the head and neck are grossly unremarkable.  Lungs: Unremarkable  Bones: No acute osseous abnormality. Multilevel degenerative changes throughout the visualized spine.      Impression:      Impression:  A few focal areas of severe stenosis to near occlusion noted within the distal right M2/M3 segments as characterized above. Additionally  there is severe stenosis to near occlusion of the distal left P1 segment/P2 segment, although the PCA is patent more   distally. Please consider follow-up with dedicated MRI of the brain to exclude an acute infarct.    Additional mild to moderate intracranial stenoses as characterized above.    No significant stenoses within the neck.    Incidentally noted ectasia of the ascending thoracic aorta and aneurysmal dilation of the descending thoracic aorta.        Electronically Signed: Pablito Childers DO    3/5/2024 6:14 PM EST    Workstation ID: [id number]    CT Angiogram Neck [[id number]] Collected: 03/05/24 1800     Updated: 03/05/24 1816    Narrative:      CT ANGIOGRAM HEAD W AI ANALYSIS OF LVO, CT ANGIOGRAM NECK    Date of Exam: 3/5/2024 5:38 PM EST    Indication: Stroke, follow up  Neuro deficit, acute stroke suspected  Acute Stroke.    Comparison: Same day head CT    Technique: CTA of the head and neck was performed after the uneventful intravenous administration of iodinated contrast. Reconstructed coronal and sagittal images were also obtained. In addition, a 3-D volume rendered image was created for   interpretation. Automated exposure control and iterative reconstruction methods were used.      Findings:  Contrast opacification: Excellent    Aortic Arch: The visualized ascending thoracic aorta is ectatic measuring up to 4.2 cm. The descending thoracic aorta is aneurysmal measuring up to 3.6 cm. Otherwise unremarkable    Right:    Innominate: Patent. Evaluation of the origin is significantly degraded due to streak artifact.  Subclavian: Patent  Common Carotid: Evaluation of the origin is nondiagnostic due to significant streak artifact. Otherwise unremarkable  External Carotid:Patent  Internal Carotid: Patent    Intracranial ICA: Extensive vascular calcifications with possible mild stenosis within the supraclinoid segment. Otherwise patent.  M2:: Multifocal moderate stenoses noted within the right M2 segment.  Additional areas of severe stenosis to near occlusion noted within the distal right M2/M3 segments (for example image 673 of series 4)..  ERIN: Multifocal mild stenoses. Otherwise  PCA: Mild to moderate stenosis noted within the distal P2 segment. Otherwise unremarkable  Vertebral: Patent    Left:    Subclavian: Patent  Common Carotid: Patent  External Carotid:Patent  Internal Carotid: Patent    Intracranial ICA: Extensive vascular calcifications with possible mild stenosis within the supraclinoid segment. Otherwise patent  MCA:Patent  ERIN: Mild to moderate stenoses of the distal ERIN.  PCA: Severe stenosis to near complete occlusion of the distal left P1 segment/proximal P2 segment (image 629 of series 4). Distally the PCA arteries patent. Additional focal areas of mild stenoses are noted within the distal P2/P3 segments.  Vertebral: Patent      Basilar: Patent      Soft Tissue: No abnormal acute intracranial enhancement.  The visualized soft tissues of the head and neck are grossly unremarkable.  Lungs: Unremarkable  Bones: No acute osseous abnormality. Multilevel degenerative changes throughout the visualized spine.      Impression:      Impression:  A few focal areas of severe stenosis to near occlusion noted within the distal right M2/M3 segments as characterized above. Additionally there is severe stenosis to near occlusion of the distal left P1 segment/P2 segment, although the PCA is patent more   distally. Please consider follow-up with dedicated MRI of the brain to exclude an acute infarct.    Additional mild to moderate intracranial stenoses as characterized above.    No significant stenoses within the neck.    Incidentally noted ectasia of the ascending thoracic aorta and aneurysmal dilation of the descending thoracic aorta.        Electronically Signed: Pablito Childers DO    3/5/2024 6:14 PM EST    Workstation ID: [id number]    CT Head Without Contrast Stroke Protocol [[id number]] Collected: 03/05/24 4135      Updated: 03/05/24 1754    Narrative:      CT HEAD WO CONTRAST STROKE PROTOCOL    Date of Exam: 3/5/2024 5:34 PM EST    Indication: Neuro deficit, acute stroke suspected  Neuro deficit, acute, stroke suspected.    Comparison: None available.    Technique: Axial CT images were obtained of the head without contrast administration.  Reconstructed coronal and sagittal images were also obtained. Automated exposure control and iterative construction methods were used.    Scan Time: 1739  Results discussed with Dr. Krishna  at 548 on 3/5/2024.    Findings: No intracranial hemorrhage. Gray-white matter differentiation is maintained without evidence of an acute infarction. Multiple foci of decreased attenuation are present within the subcortical, deep cerebral, and periventricular white matter   consistent with chronic small vessel/microangiopathic ischemic changes. No extra-axial mass or collection. The ventricles and sulci are prominent commensurate with involutional changes. The posterior fossa appears grossly normal. Sellar and suprasellar   structures are normal.    Orbital and periorbital soft tissues are normal. The paranasal sinuses, ethmoid air cells, and mastoid air cells are aerated. The bony calvarium is intact. Sessile 1.5 cm x 0.3 cm left frontal scalp lesion.      Impression:      Impression: No acute intracranial pathology.        Electronically Signed: Siddhartha Hidalgo MD    3/5/2024 5:50 PM EST    Workstation ID: [id number]    XR Chest 1 View [[id number]] Collected: 03/05/24 1726     Updated: 03/05/24 1729    Narrative:      XR CHEST 1 VW    Date of Exam: 3/5/2024 5:17 PM EST    Indication: Acute Stroke Protocol (onset < 12 hrs)    Comparison: None available.    Findings:  The heart looks enlarged. The lungs seem clear. There are no pleural effusions.      Impression:      Impression:  1.Cardiomegaly.      Electronically Signed: Eladio Barron MD    3/5/2024 5:26 PM EST    Workstation ID: [id number]             Lab  Results (last 24 hours)       Procedure Component Value Units Date/Time    High Sensitivity Troponin T 2Hr [[id number]]  (Abnormal) Collected: 03/05/24 1853    Specimen: Blood Updated: 03/05/24 1920     HS Troponin T 102 ng/L      Troponin T Delta -15 ng/L     Narrative:      High Sensitive Troponin T Reference Range:  <14.0 ng/L- Negative Female for AMI  <22.0 ng/L- Negative Male for AMI  >=14 - Abnormal Female indicating possible myocardial injury.  >=22 - Abnormal Male indicating possible myocardial injury.   Clinicians would have to utilize clinical acumen, EKG, Troponin, and serial changes to determine if it is an Acute Myocardial Infarction or myocardial injury due to an underlying chronic condition.         [facility] Draw [[id number]] Collected: 03/05/24 1644    Specimen: Blood Updated: 03/05/24 1745    Narrative:      The following orders were created for panel order [facility] Draw.  Procedure                               Abnormality         Status                     ---------                               -----------         ------                     Green Top (Gel)[[id number]]                                  Final result               Lavender Top[[id number]]                                     Final result               Gold Top - SST[[id number]]                                   Final result               Light Blue Top[[id number]]                                   Final result                 Please view results for these tests on the individual orders.    Lavender Top [[id number]] Collected: 03/05/24 1644    Specimen: Blood Updated: 03/05/24 1745     Extra Tube hold for add-on     Comment: Auto resulted       Light Blue Top [[id number]] Collected: 03/05/24 1644    Specimen: Blood Updated: 03/05/24 1745     Extra Tube Hold for add-ons.     Comment: Auto resulted       Green Top (Gel) [[id number]] Collected: 03/05/24 1644    Specimen: Blood Updated: 03/05/24 1714     Extra Tube hold    Single High Sensitivity  Troponin T [[id number]]  (Abnormal) Collected: 03/05/24 1644    Specimen: Blood Updated: 03/05/24 1711     HS Troponin T 117 ng/L     Narrative:      High Sensitive Troponin T Reference Range:  <14.0 ng/L- Negative Female for AMI  <22.0 ng/L- Negative Male for AMI  >=14 - Abnormal Female indicating possible myocardial injury.  >=22 - Abnormal Male indicating possible myocardial injury.   Clinicians would have to utilize clinical acumen, EKG, Troponin, and serial changes to determine if it is an Acute Myocardial Infarction or myocardial injury due to an underlying chronic condition.         Comprehensive Metabolic Panel [[id number]]  (Abnormal) Collected: 03/05/24 1644    Specimen: Blood Updated: 03/05/24 1710     Glucose 119 mg/dL      BUN 9 mg/dL      Creatinine 1.07 mg/dL      Sodium 140 mmol/L      Potassium 4.0 mmol/L      Chloride 101 mmol/L      CO2 29.0 mmol/L      Calcium 10.2 mg/dL      Total Protein 7.6 g/dL      Albumin 4.1 g/dL      ALT (SGPT) 9 U/L      AST (SGOT) 28 U/L      Alkaline Phosphatase 87 U/L      Total Bilirubin 0.6 mg/dL      Globulin 3.5 gm/dL      A/G Ratio 1.2 g/dL      BUN/Creatinine Ratio 8.4     Anion Gap 10.0 mmol/L      eGFR 72.4 mL/min/1.73     Narrative:      GFR Normal >60  Chronic Kidney Disease <60  Kidney Failure <15    The GFR formula is only valid for adults with stable renal function between ages 18 and 70.    Protime-INR [[id number]]  (Normal) Collected: 03/05/24 1644    Specimen: Blood Updated: 03/05/24 1705     Protime 11.5 Seconds      INR 1.06    aPTT [[id number]]  (Normal) Collected: 03/05/24 1644    Specimen: Blood Updated: 03/05/24 1705     PTT 28.0 seconds     Gold Top - SST [[id number]] Collected: 03/05/24 1644    Specimen: Blood Updated: 03/05/24 1658    POC Glucose Once [[id number]]  (Abnormal) Collected: 03/05/24 1652    Specimen: Blood Updated: 03/05/24 1654     Glucose 110 mg/dL      Comment: Serial Number: [id number]:  [id number]       CBC & Differential  [[id number]]  (Abnormal) Collected: 03/05/24 1644    Specimen: Blood Updated: 03/05/24 1651    Narrative:      The following orders were created for panel order CBC & Differential.  Procedure                               Abnormality         Status                     ---------                               -----------         ------                     CBC Auto Differential[[id number]]        Abnormal            Final result                 Please view results for these tests on the individual orders.    CBC Auto Differential [[id number]]  (Abnormal) Collected: 03/05/24 1644    Specimen: Blood Updated: 03/05/24 1651     WBC 7.60 10*3/mm3      RBC 4.76 10*6/mm3      Hemoglobin 13.7 g/dL      Hematocrit 41.7 %      MCV 87.7 fL      MCH 28.8 pg      MCHC 32.8 g/dL      RDW 14.8 %      RDW-SD 47.7 fl      MPV 7.3 fL      Platelets 261 10*3/mm3      Neutrophil % 61.4 %      Lymphocyte % 22.1 %      Monocyte % 8.5 %      Eosinophil % 7.4 %      Basophil % 0.6 %      Neutrophils, Absolute 4.70 10*3/mm3      Lymphocytes, Absolute 1.70 10*3/mm3      Monocytes, Absolute 0.60 10*3/mm3      Eosinophils, Absolute 0.60 10*3/mm3      Basophils, Absolute 0.00 10*3/mm3      nRBC 0.0 /100 WBC              I reviewed the patient's new clinical results.    Assessment & Plan     TIA  Elevated troponin  -troponin 117  -EKG sinus rhythm  -CXR shows cardiomegaly  -CT head shows no acute findings  -CTA's shows a few focal areas of stenosis, recommending MRI follow up  -Neurology was consulted and recommended starting patient on Plavix and aspirin  -MRI pending for morning      DVT prophylaxis- SCD's  GI prophylaxis- ppi    I discussed the patient's findings and my recommendations with patient.     Katia Davalos, APRN  03/05/24  22:10 EST

## 2024-03-06 NOTE — PLAN OF CARE
Goal Outcome Evaluation:                                              Problem: Cerebral Tissue Perfusion (Stroke, Ischemic/Transient Ischemic Attack)  Goal: Optimal Cerebral Tissue Perfusion  Problem: Adjustment to Illness (Stroke, Ischemic/Transient Ischemic Attack)  Goal: Optimal Coping  Outcome: Ongoing, Progressing     Problem: Bowel Elimination Impaired (Stroke, Ischemic/Transient Ischemic Attack)  Goal: Effective Bowel Elimination  Outcome: Ongoing, Progressing     Problem: Cognitive Impairment (Stroke, Ischemic/Transient Ischemic Attack)  Goal: Optimal Cognitive Function  Outcome: Ongoing, Progressing     Problem: Communication Impairment (Stroke, Ischemic/Transient Ischemic Attack)  Goal: Improved Communication Skills  Outcome: Ongoing, Progressing     Problem: Functional Ability Impaired (Stroke, Ischemic/Transient Ischemic Attack)  Goal: Optimal Functional Ability  Outcome: Ongoing, Progressing     Problem: Respiratory Compromise (Stroke, Ischemic/Transient Ischemic Attack)  Goal: Effective Oxygenation and Ventilation  Outcome: Ongoing, Progressing     Problem: Sensorimotor Impairment (Stroke, Ischemic/Transient Ischemic Attack)  Goal: Improved Sensorimotor Function  Outcome: Ongoing, Progressing     Problem: Swallowing Impairment (Stroke, Ischemic/Transient Ischemic Attack)  Goal: Optimal Eating and Swallowing without Aspiration  Outcome: Ongoing, Progressing     Problem: Urinary Elimination Impaired (Stroke, Ischemic/Transient Ischemic Attack)  Goal: Effective Urinary Elimination  Outcome: Ongoing, Progressing     Outcome: Unable to Meet, Plan Revised

## 2024-03-06 NOTE — THERAPY EVALUATION
"Patient Name: Roger Lambert  : 1950    MRN: [id number]                              Today's Date: 3/6/2024       Admit Date: 3/5/2024    Visit Dx:     ICD-10-CM ICD-9-CM   1. TIA (transient ischemic attack)  G45.9 435.9   2. Elevated troponin  R79.89 790.6     Patient Active Problem List   Diagnosis    TIA (transient ischemic attack)     History reviewed. No pertinent past medical history.  History reviewed. No pertinent surgical history.   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.\"  -     Existing Precautions/Restrictions no known precautions/restrictions;other (see comments)  severe htn this pm  -     Barriers to Rehab medically complex  -       Row Name 24 1442          Living Environment    People in Home alone  spouse passed away last week  -       Row Name 24 1442          Home Main Entrance    Number of Stairs, Main Entrance five  -     Stair Railings, Main Entrance railings safe and in good condition  -       Row Name 24 1442          Stairs Within Home, Primary    Number of Stairs, Within Home, Primary none  -       Row Name 24 1442          Cognition    Orientation Status (Cognition) oriented to;person;place;time;other (see comments)  scores 12/15 on BIMS indicating mild cognitive impairment in attention & memory. Guessed the date to be  not .  -       Row Name 24 1442          Safety Issues, Functional Mobility    Safety Issues Affecting Function (Mobility) judgment;problem-solving;insight into deficits/self-awareness  -     Impairments Affecting Function (Mobility) " endurance/activity tolerance;cognition  -               User Key  (r) = Recorded By, (t) = Taken By, (c) = Cosigned By      Initials Name Provider Type     June Vásquez OT Occupational Therapist                     Mobility/ADL's       Row Name 03/06/24 1448          Bed Mobility    Bed Mobility bed mobility (all) activities  -     All Activities, [place] (Bed Mobility) set up;supervision  -     Assistive Device (Bed Mobility) head of bed elevated;bed rails  -       Row Name 03/06/24 1448          Transfers    Transfers sit-stand transfer;stand-sit transfer  -       Row Name 03/06/24 1448          Sit-Stand Transfer    Sit-Stand [place] (Transfers) independent  -       Row Name 03/06/24 1448          Stand-Sit Transfer    Stand-Sit [place] (Transfers) independent  -       Row Name 03/06/24 1448          Functional Mobility    Functional Mobility- Ind. Level independent  -     Functional Mobility- Comment shuffles a little. Pt wanted to get up and walk around in the room so OT allowed this though BP appeared quite high, in hopes that his BP would drop. It did not. Pt may have permissive HTN if his CTA is nearly occluded. RN notified. Cardiology is consulted.  -     Patient was able to Ambulate yes  -       Row Name 03/06/24 1448          Activities of Daily Living    BADL Assessment/Intervention lower body dressing;feeding  -       Row Name 03/06/24 1448          Lower Body Dressing Assessment/Training    [place] Level (Lower Body Dressing) don;shoes/slippers;socks;modified independence  -     Position (Lower Body Dressing) sitting up in bed  -       Row Name 03/06/24 1448          Self-Feeding Assessment/Training    [place] Level (Feeding) feeding skills;independent  -     Position (Self-Feeding) sitting up in bed  -               User Key  (r) = Recorded By, (t) = Taken By, (c) = Cosigned By      Initials Name Provider Type     June Vásquez, OT  Occupational Therapist                   Obj/Interventions       Row Name 03/06/24 1454          Sensory Assessment (Somatosensory)    Sensory Assessment (Somatosensory) sensation intact  -     Sensory Assessment numbness resolved  -       Row Name 03/06/24 1454          Vision Assessment/Intervention    Visual Impairment/Limitations WFL  -     Vision Assessment Comment blurry vision resolved  -[facility] Name 03/06/24 1454          Range of Motion Comprehensive    Comment, General Range of Motion baseline shld flex 75% 2* stiffness. Pt reports this is chronic.  -       Row Name 03/06/24 1454          Strength Comprehensive (MMT)    Comment, General Manual Muscle Testing (MMT) Assessment mild rt  weakness compared to non-dominant left . Hips 4-/5, shld flex 3+/5  -               User Key  (r) = Recorded By, (t) = Taken By, (c) = Cosigned By      Initials Name Provider Type     June Vásquez, OT Occupational Therapist                   Goals/Plan       Row Name 03/06/24 1507          Bathing Goal 1 (OT)    Activity/Device (Bathing Goal 1, OT) bathing skills, all  -     [place] Level/Cues Needed (Bathing Goal 1, OT) modified independence  -     Time Frame (Bathing Goal 1, OT) 10 days  -[facility] Name 03/06/24 1507          Dressing Goal 1 (OT)    Activity/Device (Dressing Goal 1, OT) dressing skills, all  -     [place]/Cues Needed (Dressing Goal 1, OT) modified independence  -     Time Frame (Dressing Goal 1, OT) 10 days  -[facility] Name 03/06/24 1507          Toileting Goal 1 (OT)    Activity/Device (Toileting Goal 1, OT) toileting skills, all  -     [place] Level/Cues Needed (Toileting Goal 1, OT) independent  -     Time Frame (Toileting Goal 1, OT) 1 week  -[facility] Name 03/06/24 1507          Grooming Goal 1 (OT)    Activity/Device (Grooming Goal 1, OT) grooming skills, all  -     [place] (Grooming Goal 1, OT) independent  standing at sink w/o  undue effect to vital signs.  -     Time Frame (Grooming Goal 1, OT) 1 week  -       Row Name 03/06/24 1505          Therapy Assessment/Plan (OT)    Planned Therapy Interventions (OT) activity tolerance training;adaptive equipment training;BADL retraining;cognitive/visual perception retraining;transfer/mobility retraining;patient/caregiver education/training;occupation/activity based interventions  -               User Key  (r) = Recorded By, (t) = Taken By, (c) = Cosigned By      Initials Name Provider Type     June Vásuqez, VÍCTOR Occupational Therapist                   Clinical Impression       Row Name 03/06/24 1456          Pain Assessment    Pretreatment Pain Rating 2/10  -     Posttreatment Pain Rating 2/10  -     Pain Location - Side/Orientation Left  toes  -       Row Name 03/06/24 1458          Plan of Care Review    Plan of Care Reviewed With patient;sibling  -     Progress improving  -     Outcome Evaluation Pt is 72 y/o M admitted with rt arm numbness and transient vision blurriness. Pt reports he fell asleep in his recliner and woke up feeling like he had slept on his arm. His acute symptoms are gone now per his report but his BP is very high. Pt did not require physical assist this date to get up, put on his socks, shoes, and walk in the room. He did demonstrate mild cognitive deficit. He is grieving the loss of his spouse last week. Pt is found on CT and MRI scans to have had punctuate subacute PCA teritory events and his left carotid is severely stenotic and may be fully blocked. Cardiology is consulted. Assuming pt may have permissive HTN so allowed him to walk around a little and he was asymptomatic except for fatigue. Pt laid back down and RN was notified. Current OT d/c recomendation is home w/ [facility] but will need to continue to assess pt function as he is treated by his medical team.  -       Row Name 03/06/24 1458          Therapy Assessment/Plan (OT)    Rehab Potential (OT)  good, to achieve stated therapy goals  -     Criteria for Skilled Therapeutic Interventions Met (OT) skilled treatment is necessary  -     Therapy Frequency (OT) 5 times/wk  -     Predicted Duration of Therapy Intervention (OT) until d/c  -       Row Name 03/06/24 1456          Therapy Plan Review/Discharge Plan (OT)    Anticipated Discharge Disposition (OT) home with home health  -       Row Name 03/06/24 1456          Vital Signs    O2 Delivery Pre Treatment room air  -     O2 Delivery Intra Treatment room air  -     O2 Delivery Post Treatment room air  -     Pre Patient Position Supine  -     Intra Patient Position Standing  -     Post Patient Position Supine  -       Row Name 03/06/24 1456          Positioning and Restraints    Pre-Treatment Position in bed  -     Post Treatment Position bed  -MH     In Bed notified nsg;fowlers;encouraged to call for assist;call light within reach;with family/caregiver  -               User Key  (r) = Recorded By, (t) = Taken By, (c) = Cosigned By      Initials Name Provider Type     June Vásquez OT Occupational Therapist                   Outcome Measures       Row Name 03/06/24 1508 03/06/24 1342       Modified [place] Scale    Pre-Stroke Modified [place] Scale 0 - No Symptoms at all.  - 3 - Moderate disability.  Requiring some help, but able to walk without assistance. (P)   -    Modified [place] Scale 2 - Slight disability.  Unable to carry out all previous activities but able to look after own affairs without assistance.  - --      Row Name 03/06/24 1508 03/06/24 1342       Functional Assessment    Outcome Measure Options Modified [place]  - Modified [place] (P)   -              User Key  (r) = Recorded By, (t) = Taken By, (c) = Cosigned By      Initials Name Provider Type     June Vásquez OT Occupational Therapist     Jeanine Cueto, PT Student PT Student                    Occupational Therapy Education       Title: PT OT SLP  Therapies (Done)       Topic: Occupational Therapy (Done)       Point: ADL training (Done)       Description:   Instruct learner(s) on proper safety adaptation and remediation techniques during self care or transfers.   Instruct in proper use of assistive devices.                  Learning Progress Summary             Patient Acceptance, E,TB,D, VU,NR,DU by  at 3/6/2024 1509   Family Acceptance, E,TB,D, VU,NR,DU by  at 3/6/2024 1509                         Point: Home exercise program (Done)       Description:   Instruct learner(s) on appropriate technique for monitoring, assisting and/or progressing therapeutic exercises/activities.                  Learning Progress Summary             Patient Acceptance, E,TB,D, VU,NR,DU by  at 3/6/2024 1509   Family Acceptance, E,TB,D, VU,NR,DU by  at 3/6/2024 1509                         Point: Precautions (Done)       Description:   Instruct learner(s) on prescribed precautions during self-care and functional transfers.                  Learning Progress Summary             Patient Acceptance, E,TB,D, VU,NR,DU by  at 3/6/2024 1509   Family Acceptance, E,TB,D, VU,NR,DU by  at 3/6/2024 1509                         Point: Body mechanics (Done)       Description:   Instruct learner(s) on proper positioning and spine alignment during self-care, functional mobility activities and/or exercises.                  Learning Progress Summary             Patient Acceptance, E,TB,D, VU,NR,DU by  at 3/6/2024 1509   Family Acceptance, E,TB,D, VU,NR,DU by  at 3/6/2024 1509                                         User Key       Initials Effective Dates Name Provider Type Discipline     06/16/21 -  June Vásquez OT Occupational Therapist OT                  OT Recommendation and Plan  Planned Therapy Interventions (OT): activity tolerance training, adaptive equipment training, BADL retraining, cognitive/visual perception retraining, transfer/mobility retraining,  patient/caregiver education/training, occupation/activity based interventions  Therapy Frequency (OT): 5 times/wk  Plan of Care Review  Plan of Care Reviewed With: patient, sibling  Progress: improving  Outcome Evaluation: Pt is 72 y/o M admitted with rt arm numbness and transient vision blurriness. Pt reports he fell asleep in his recliner and woke up feeling like he had slept on his arm. His acute symptoms are gone now per his report but his BP is very high. Pt did not require physical assist this date to get up, put on his socks, shoes, and walk in the room. He did demonstrate mild cognitive deficit. He is grieving the loss of his spouse last week. Pt is found on CT and MRI scans to have had punctuate subacute PCA teritory events and his left carotid is severely stenotic and may be fully blocked. Cardiology is consulted. Assuming pt may have permissive HTN so allowed him to walk around a little and he was asymptomatic except for fatigue. Pt laid back down and RN was notified. Current OT d/c recomendation is home w/ [facility] but will need to continue to assess pt function as he is treated by his medical team.     Time Calculation:         Time Calculation- OT       Row Name 03/06/24 1509             Time Calculation-     OT Start Time 1401  -      OT Stop Time 1423  -      OT Time Calculation (min) 22 min  -      Total Timed Code Minutes- OT 0 minute(s)  -      OT Received On 03/06/24  -      OT - Next Appointment 03/07/24  -      OT Goal Re-Cert Due Date 03/20/24  -                User Key  (r) = Recorded By, (t) = Taken By, (c) = Cosigned By      Initials Name Provider Type     June Vásquez OT Occupational Therapist                  Therapy Charges for Today       Code Description Service Date Service Provider Modifiers Qty    [id number]  OT EVAL HIGH COMPLEXITY 4 3/6/2024 June Vásquez OT GO 1                 June Vásquez OT  3/6/2024

## 2024-03-06 NOTE — CASE MANAGEMENT/SOCIAL WORK
Discharge Planning Assessment   [place]     Patient Name: Roger Lambert  MRN: [id number]  Today's Date: 3/6/2024    Admit Date: 3/5/2024    Plan: Home alone, pending PT eval   Discharge Needs Assessment       Row Name 03/06/24 1245       Living Environment    People in Home alone    Current Living Arrangements home    Potentially Unsafe Housing Conditions none    In the past 12 months has the electric, gas, oil, or water company threatened to shut off services in your home? No    Primary Care Provided by self    Provides Primary Care For no one    Quality of Family Relationships supportive    Able to Return to Prior Arrangements yes       Resource/Environmental Concerns    Resource/Environmental Concerns none    Transportation Concerns none       Transportation Needs    In the past 12 months, has lack of transportation kept you from medical appointments or from getting medications? no    In the past 12 months, has lack of transportation kept you from meetings, work, or from getting things needed for daily living? No       Food Insecurity    Within the past 12 months, you worried that your food would run out before you got the money to buy more. Never true    Within the past 12 months, the food you bought just didn't last and you didn't have money to get more. Never true       Transition Planning    Patient/Family Anticipates Transition to home    Patient/Family Anticipated Services at Transition none    Transportation Anticipated family or friend will provide       Discharge Needs Assessment    Readmission Within the Last 30 Days no previous admission in last 30 days    Equipment Currently Used at Home none    Concerns to be Addressed denies needs/concerns at this time    Anticipated Changes Related to Illness none    Equipment Needed After Discharge none                   Discharge Plan       Row Name 03/06/24 1246       Plan    Plan Home alone, pending PT eval    Plan Comments Met with Patient at bedside Lives at  home alone. IADL's PCP and pharmacy verified, No transportation or financial concerns. Drives self but family can provide transportation. DC barriers: neuro, PT/OT eval                  Continued Care and Services - Admitted Since 3/5/2024    No active coordination exists for this encounter.          Demographic Summary       Row Name 03/06/24 1244       General Information    Admission Type observation    Arrived From emergency department    Required Notices Provided Observation Status Notice    Referral Source admission list    Reason for Consult discharge planning    Preferred Language English                   Functional Status       Row Name 03/06/24 1245       Functional Status    Usual Activity Tolerance good    Current Activity Tolerance good       Functional Status, IADL    Medications independent    Meal Preparation independent    Housekeeping independent    Laundry independent    Shopping independent       Mental Status    General Appearance WDL WDL       Mental Status Summary    Recent Changes in Mental Status/Cognitive Functioning no changes                            Patient Forms       Row Name 03/06/24 1027       Patient Forms    Important Message from Medicare (IMM) --  August 3/5 per reg                      Nayely Irizarry RN

## 2024-03-06 NOTE — CONSULTS
Primary Care Provider: Abel Perez MD     Consult requested by:  Dr. Krishna     Reason for Consultation: Neurological evaluation, TIA/Stroke     History taken from: patient chart    Chief complaint: Right sided numbbess        SUBJECTIVE:    History of present illness: Background per H&P: Roger Lambert is a 73 y.o. year old male who presented to the ER with reports of right arm and leg weakness, and blurry vision yesterday evening when he was sitting in the recliner that resolved after a few minutes. He reports  today around noon he had the same symptoms but did not have blurry vision. He states his left arm and leg felt numb and tingly. He denies any headache, fever, chills, dizziness, chest pain, shortness of breath. Upon arrival to the ER he reported that his leg symptoms had resolved but still had some tingling in his right arm.   In the ER troponin 117, EKG sinus rhythm, CXR shows cardiomegaly, CT head shows no acute findings, CTA's shows a few focal areas of stenosis, recommending MRI follow up. Neurology was consulted and recommended starting patient on Plavix and aspirin.     - Portions of the above HPI were copied from previous encounters and edited as appropriate. PMH as detailed below.     Pt had transient right arm and leg weakness along with b/l blurred vision. Symptoms resolved. Today he denies any focal weakness, vision changes, or speech issues. He recently lost his wife and has been dealing with her arrangements prior to when this started.     Pt does not take ASA daily. No hx of strokes that he is aware.       Review of Systems   HENT: Negative.     Eyes:  Positive for visual disturbance.   Respiratory: Negative.     Cardiovascular: Negative.    Gastrointestinal:  Negative for nausea and vomiting.   Endocrine: Negative.    Genitourinary: Negative.    Musculoskeletal: Negative.    Skin: Negative.    Allergic/Immunologic: Negative.    Neurological:  Positive for weakness and numbness. Negative  for dizziness, tremors, seizures, syncope, facial asymmetry, speech difficulty, light-headedness and headaches.   Psychiatric/Behavioral:  Negative for confusion.         PATIENT HISTORY:  History reviewed. No pertinent past medical history., History reviewed. No pertinent surgical history., History reviewed. No pertinent family history.,   Social History     Tobacco Use    Smoking status: Never    Smokeless tobacco: Never   Vaping Use    Vaping status: Never Used   Substance Use Topics    Alcohol use: Never    Drug use: Never   ,   Prior to Admission medications    Medication Sig Start Date End Date Taking? Authorizing Provider   irbesartan-hydrochlorothiazide (AVALIDE) 150-12.5 MG tablet Take 0.5 tablets by mouth Daily.   Yes ProviderRhonda MD   latanoprost (XALATAN) 0.005 % ophthalmic solution Administer 1 drop to both eyes Every Night.   Yes ProviderRhonda MD   rosuvastatin (CRESTOR) 10 MG tablet Take 1 tablet by mouth Daily.   Yes Provider, MD Rhonda    Allergies:  Patient has no known allergies.    Current Facility-Administered Medications   Medication Dose Route Frequency Provider Last Rate Last Admin    acetaminophen (TYLENOL) tablet 650 mg  650 mg Oral Q4H PRN Katia Davalos APRN        aspirin EC tablet 81 mg  81 mg Oral Daily Ashley Root MD        sennosides-docusate (PERICOLACE) 8.6-50 MG per tablet 2 tablet  2 tablet Oral BID PRN Katia Davalos APRN        And    polyethylene glycol (MIRALAX) packet 17 g  17 g Oral Daily PRN Katia Davalos APRN        And    bisacodyl (DULCOLAX) EC tablet 5 mg  5 mg Oral Daily PRN Katia Davalos APRN        And    bisacodyl (DULCOLAX) suppository 10 mg  10 mg Rectal Daily PRN Katia Davalos APRN        Calcium Replacement - Follow Nurse / BPA Driven Protocol   Does not apply PRN Katia Davalos APRN        clopidogrel (PLAVIX) tablet 75 mg  75 mg Oral Daily Ashley Root MD        famotidine (PEPCID) tablet 40 mg  40 mg Oral Daily  Katia Davalos APRN   40 mg at 03/06/24 0826    losartan (COZAAR) tablet 50 mg  50 mg Oral Q24H Katia Davalos APRN   50 mg at 03/06/24 0826    And    hydroCHLOROthiazide tablet 12.5 mg  12.5 mg Oral Q24H Katia Davalos APRN   12.5 mg at 03/06/24 0826    latanoprost (XALATAN) 0.005 % ophthalmic solution 1 drop  1 drop Both Eyes Nightly Katia Davalos APRN        Magnesium Standard Dose Replacement - Follow Nurse / BPA Driven Protocol   Does not apply PRN Katia Davalos APRN        nitroglycerin (NITROSTAT) SL tablet 0.4 mg  0.4 mg Sublingual Q5 Min PRN Katia Davalos APRN        ondansetron (ZOFRAN) injection 4 mg  4 mg Intravenous Q6H PRN Katia Davalos APRN        Phosphorus Replacement - Follow Nurse / BPA Driven Protocol   Does not apply PRN Katia Davalos APRN        Potassium Replacement - Follow Nurse / BPA Driven Protocol   Does not apply PRN Katia Davalos APRN        rosuvastatin (CRESTOR) tablet 10 mg  10 mg Oral Daily Katia Davlaos APRN   10 mg at 03/06/24 0826    sodium chloride 0.9 % flush 10 mL  10 mL Intravenous PRN Katia Davalos APRN        sodium chloride 0.9 % flush 10 mL  10 mL Intravenous Q12H Katia Davalos APRN   10 mL at 03/06/24 0827    sodium chloride 0.9 % flush 10 mL  10 mL Intravenous PRN Katia Davalos APRN        sodium chloride 0.9 % infusion 40 mL  40 mL Intravenous PRN Katia Davalos APRN         Current Outpatient Medications   Medication Sig Dispense Refill    irbesartan-hydrochlorothiazide (AVALIDE) 150-12.5 MG tablet Take 0.5 tablets by mouth Daily.      latanoprost (XALATAN) 0.005 % ophthalmic solution Administer 1 drop to both eyes Every Night.      rosuvastatin (CRESTOR) 10 MG tablet Take 1 tablet by mouth Daily.          ________________________________________________________        OBJECTIVE:    PHYSICAL EXAM:    Constitutional: The patient is in no apparent distress, bright awake and alert. There is no shortness of breath.     PSYCHIATRIC:  Mood/affect normal, judgement normal, appropriate    HEENT: Normocephalic, atraumatic.     Chest: Breathing unlabored    Cardiac: Regular rate and rhythm.     Extremities:  No clubbing, cyanosis or edema.    NEUROLOGICAL:    Cognition:   Fully oriented.  Fund of knowledge excellent.  Concentration and attention normal.   Language normal with normal comprehension, fluent speech, intact repetition and naming.   Short and long term memory appears intact    Cranial nerves;    II - pupils bilaterally equal reacting to light,  No new Visual field deficits;  Fundoscopic exam- Not able to be done, non-dilated exam  III,IV,VI: EOMI with no diplopia  V: Normal facial sensations  VII: No facial asymmetry,  VIII: No New hearing abnormality  IX, X, XI: normal gag and shoulder shrug;  XII: tongue is in the midline.    Sensory:  Intact to light touch in all extremities.     Motor: Strength 5/5 bilaterally upper and lower extremities. No involuntary movements present. Normal tone and bulk.    Cerebellar: Finger to nose and mirror movements normal bilaterally.    Gait and balance: Deferred.     Physical exam performed by GEORGIA Swartz.  ________________________________________________________   RESULTS REVIEW:    VITAL SIGNS:   Temp:  [98 °F (36.7 °C)-98.7 °F (37.1 °C)] 98 °F (36.7 °C)  Heart Rate:  [52-82] 56  Resp:  [12-20] 16  BP: (112-174)/(59-85) 155/75     LABS:      Lab 03/06/24  0551 03/05/24  1644   WBC 7.20 7.60   HEMOGLOBIN 13.0 13.7   HEMATOCRIT 39.9 41.7   PLATELETS 264 261   NEUTROS ABS 3.20 4.70   LYMPHS ABS 2.10 1.70   MONOS ABS 0.70 0.60   EOS ABS 1.00* 0.60*   MCV 90.4 87.7   PROTIME  --  11.5   APTT  --  28.0         Lab 03/06/24  0551 03/05/24  1644   SODIUM 139 140   POTASSIUM 4.0 4.0   CHLORIDE 101 101   CO2 27.0 29.0   ANION GAP 11.0 10.0   BUN 10 9   CREATININE 1.03 1.07   EGFR 75.8 72.4   GLUCOSE 94 119*   CALCIUM 9.8 10.2         Lab 03/05/24  1644   TOTAL PROTEIN 7.6   ALBUMIN 4.1   GLOBULIN 3.5   ALT  "(SGPT) 9   AST (SGOT) 28   BILIRUBIN 0.6   ALK PHOS 87         Lab 03/06/24  0551 03/05/24  1853 03/05/24  1644   HSTROP T 67* 102* 117*   PROTIME  --   --  11.5   INR  --   --  1.06             Lab 03/05/24  1705   ABO TYPING O   RH TYPING Positive   ANTIBODY SCREEN Negative             No results found for: \"TSH\", \"LDL\", \"HGBA1C\", \"[id number]\", \"PHENYTOIN\", \"PHENOBARB\", \"VALPROATE\", \"CBMZ\"    IMAGING STUDIES:  CT Angiogram Head w AI Analysis of LVO    Result Date: 3/5/2024  Impression: A few focal areas of severe stenosis to near occlusion noted within the distal right M2/M3 segments as characterized above. Additionally there is severe stenosis to near occlusion of the distal left P1 segment/P2 segment, although the PCA is patent more distally. Please consider follow-up with dedicated MRI of the brain to exclude an acute infarct. Additional mild to moderate intracranial stenoses as characterized above. No significant stenoses within the neck. Incidentally noted ectasia of the ascending thoracic aorta and aneurysmal dilation of the descending thoracic aorta. Electronically Signed: Pablito Childers DO  3/5/2024 6:14 PM EST  Workstation ID: [id number]    CT Angiogram Neck    Result Date: 3/5/2024  Impression: A few focal areas of severe stenosis to near occlusion noted within the distal right M2/M3 segments as characterized above. Additionally there is severe stenosis to near occlusion of the distal left P1 segment/P2 segment, although the PCA is patent more distally. Please consider follow-up with dedicated MRI of the brain to exclude an acute infarct. Additional mild to moderate intracranial stenoses as characterized above. No significant stenoses within the neck. Incidentally noted ectasia of the ascending thoracic aorta and aneurysmal dilation of the descending thoracic aorta. Electronically Signed: Pablito Childers DO  3/5/2024 6:14 PM EST  Workstation ID: [id number]    CT Head Without Contrast Stroke " Protocol    Result Date: 3/5/2024  Impression: No acute intracranial pathology. Electronically Signed: Siddhartha Hidalgo MD  3/5/2024 5:50 PM EST  Workstation ID: [id number]    XR Chest 1 View    Result Date: 3/5/2024  Impression: 1.Cardiomegaly. Electronically Signed: Eladio Barron MD  3/5/2024 5:26 PM EST  Workstation ID: [id number]     I reviewed the patient's new clinical results.    ________________________________________________________     PROBLEM LIST:    TIA (transient ischemic attack)            ASSESSMENT/PLAN:    Subacute strokes within the left PCA territory. On CTA, patient has severe stenosis, possible occlusion of the left PCA likely 2/2 atherosclerotic disease.   - CT head: No acute findings, no hemorrhage  - MRI brain personally reviewed   - CTA head and neck: Focal areas of soft severe stenosis to near occlusion of the distal right M2 and M3 segments.  Severe stenosis and near occlusion of the distal left P1 and P2.  No significant stenosis of the neck.  - Echo: pending   - EKG: SR,, prolong TX interval   - Labs: A1C: 5.90, B12: P, LDL: 50, TSH: 2.890  - ASA 81 mg and Plavix 75 mg daily   - Crestor 20   - PT/OT/ST as appropriate, Neuro checks per protocol, DVT prophylaxis, Stroke education    Modification of stroke risk factors:   - BP <130/80, HbA1c <6.5, LDL < 70; b12>500 and smoking cessation if applicable.    2.  Hypertension  - Continue home medications  - BP goal 130/90, avoid hypotension    3.  Hyperlipidemia  - Statin & dietary modifications    4. Intracranial atherosclerotic disease- DAPT, high dose statin, lifestyle modifications       I discussed the patient's findings and my recommendations with patient and family    Vanessa Wyatt, ROCAEL  03/06/24  09:53 EST

## 2024-03-06 NOTE — NURSING NOTE
"WOCN note:    73 yr old male admitted 3/5/24 with TIA. WOCN consult received for left great toe wound. Patient presents with an open callus to his medial left hallux measuring approximately 1.3x0.4cm. The wound base is red and clean. The wound edges are thick callus.   Patient reports he does not see a podiatrist. 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 He stated he does have an appointment with his PCP next week. Patient reports he has not been diagnosed with DM but has been told he is \"borderline\". His current A1c is 5.9. Unable to palpate a pedal pulse but the foot is warm and dry with a brisk cap refill.     The wound was painted with Betadine and a Betadine wet to dry dressing was applied. Recommend patient follow up with OP wound center after discharge. We will continue to follow as needed.               "

## 2024-03-07 ENCOUNTER — APPOINTMENT (OUTPATIENT)
Dept: CT IMAGING | Facility: HOSPITAL | Age: 74
DRG: 066 | End: 2024-03-07
Payer: MEDICARE

## 2024-03-07 ENCOUNTER — READMISSION MANAGEMENT (OUTPATIENT)
Dept: CALL CENTER | Facility: HOSPITAL | Age: 74
End: 2024-03-07
Payer: MEDICARE

## 2024-03-07 VITALS
SYSTOLIC BLOOD PRESSURE: 137 MMHG | DIASTOLIC BLOOD PRESSURE: 78 MMHG | RESPIRATION RATE: 22 BRPM | TEMPERATURE: 97.7 F | BODY MASS INDEX: 36.98 KG/M2 | HEIGHT: 72 IN | HEART RATE: 63 BPM | OXYGEN SATURATION: 97 % | WEIGHT: 273 LBS

## 2024-03-07 LAB
ANION GAP SERPL CALCULATED.3IONS-SCNC: 12 MMOL/L (ref 5–15)
BASOPHILS # BLD AUTO: 0.1 10*3/MM3 (ref 0–0.2)
BASOPHILS NFR BLD AUTO: 1.3 % (ref 0–1.5)
BUN SERPL-MCNC: 11 MG/DL (ref 8–23)
BUN/CREAT SERPL: 9.7 (ref 7–25)
CALCIUM SPEC-SCNC: 9.6 MG/DL (ref 8.6–10.5)
CHLORIDE SERPL-SCNC: 99 MMOL/L (ref 98–107)
CO2 SERPL-SCNC: 28 MMOL/L (ref 22–29)
CREAT SERPL-MCNC: 1.13 MG/DL (ref 0.76–1.27)
DEPRECATED RDW RBC AUTO: 48.1 FL (ref 37–54)
EGFRCR SERPLBLD CKD-EPI 2021: 67.8 ML/MIN/1.73
EOSINOPHIL # BLD AUTO: 1 10*3/MM3 (ref 0–0.4)
EOSINOPHIL NFR BLD AUTO: 13.7 % (ref 0.3–6.2)
ERYTHROCYTE [DISTWIDTH] IN BLOOD BY AUTOMATED COUNT: 14.8 % (ref 12.3–15.4)
GLUCOSE SERPL-MCNC: 103 MG/DL (ref 65–99)
HCT VFR BLD AUTO: 40 % (ref 37.5–51)
HGB BLD-MCNC: 13.3 G/DL (ref 13–17.7)
LYMPHOCYTES # BLD AUTO: 1.8 10*3/MM3 (ref 0.7–3.1)
LYMPHOCYTES NFR BLD AUTO: 24.1 % (ref 19.6–45.3)
MCH RBC QN AUTO: 29.4 PG (ref 26.6–33)
MCHC RBC AUTO-ENTMCNC: 33.4 G/DL (ref 31.5–35.7)
MCV RBC AUTO: 88.2 FL (ref 79–97)
MONOCYTES # BLD AUTO: 0.7 10*3/MM3 (ref 0.1–0.9)
MONOCYTES NFR BLD AUTO: 9.3 % (ref 5–12)
NEUTROPHILS NFR BLD AUTO: 3.9 10*3/MM3 (ref 1.7–7)
NEUTROPHILS NFR BLD AUTO: 51.6 % (ref 42.7–76)
NRBC BLD AUTO-RTO: 0 /100 WBC (ref 0–0.2)
PLATELET # BLD AUTO: 258 10*3/MM3 (ref 140–450)
PMV BLD AUTO: 7.3 FL (ref 6–12)
POTASSIUM SERPL-SCNC: 3.6 MMOL/L (ref 3.5–5.2)
RBC # BLD AUTO: 4.53 10*6/MM3 (ref 4.14–5.8)
SODIUM SERPL-SCNC: 139 MMOL/L (ref 136–145)
WBC NRBC COR # BLD AUTO: 7.5 10*3/MM3 (ref 3.4–10.8)

## 2024-03-07 PROCEDURE — 80048 BASIC METABOLIC PNL TOTAL CA: CPT

## 2024-03-07 PROCEDURE — 71260 CT THORAX DX C+: CPT

## 2024-03-07 PROCEDURE — 99232 SBSQ HOSP IP/OBS MODERATE 35: CPT | Performed by: NURSE PRACTITIONER

## 2024-03-07 PROCEDURE — 97535 SELF CARE MNGMENT TRAINING: CPT

## 2024-03-07 PROCEDURE — 97530 THERAPEUTIC ACTIVITIES: CPT

## 2024-03-07 PROCEDURE — 25010000002 CYANOCOBALAMIN PER 1000 MCG: Performed by: NURSE PRACTITIONER

## 2024-03-07 PROCEDURE — 25510000001 IOPAMIDOL PER 1 ML: Performed by: FAMILY MEDICINE

## 2024-03-07 PROCEDURE — 36415 COLL VENOUS BLD VENIPUNCTURE: CPT

## 2024-03-07 PROCEDURE — 85025 COMPLETE CBC W/AUTO DIFF WBC: CPT

## 2024-03-07 RX ORDER — ROSUVASTATIN CALCIUM 20 MG/1
20 TABLET, COATED ORAL DAILY
Qty: 90 TABLET | Refills: 1 | Status: SHIPPED | OUTPATIENT
Start: 2024-03-08

## 2024-03-07 RX ORDER — CLOPIDOGREL BISULFATE 75 MG/1
75 TABLET ORAL DAILY
Qty: 30 TABLET | Refills: 1 | Status: SHIPPED | OUTPATIENT
Start: 2024-03-08

## 2024-03-07 RX ORDER — FAMOTIDINE 40 MG/1
40 TABLET, FILM COATED ORAL DAILY
Qty: 30 TABLET | Refills: 1 | Status: SHIPPED | OUTPATIENT
Start: 2024-03-08

## 2024-03-07 RX ORDER — POTASSIUM CHLORIDE 20 MEQ/1
40 TABLET, EXTENDED RELEASE ORAL EVERY 4 HOURS
Status: COMPLETED | OUTPATIENT
Start: 2024-03-07 | End: 2024-03-07

## 2024-03-07 RX ORDER — ASPIRIN 81 MG/1
81 TABLET ORAL DAILY
Qty: 30 TABLET | Refills: 1 | Status: SHIPPED | OUTPATIENT
Start: 2024-03-08

## 2024-03-07 RX ORDER — CYANOCOBALAMIN 1000 UG/ML
1000 INJECTION, SOLUTION INTRAMUSCULAR; SUBCUTANEOUS
Status: DISCONTINUED | OUTPATIENT
Start: 2024-03-07 | End: 2024-03-07 | Stop reason: HOSPADM

## 2024-03-07 RX ADMIN — CLOPIDOGREL BISULFATE 75 MG: 75 TABLET ORAL at 08:14

## 2024-03-07 RX ADMIN — HYDROCHLOROTHIAZIDE 12.5 MG: 12.5 TABLET ORAL at 08:14

## 2024-03-07 RX ADMIN — ROSUVASTATIN 20 MG: 10 TABLET, FILM COATED ORAL at 08:14

## 2024-03-07 RX ADMIN — POTASSIUM CHLORIDE 40 MEQ: 1500 TABLET, EXTENDED RELEASE ORAL at 08:14

## 2024-03-07 RX ADMIN — FAMOTIDINE 40 MG: 20 TABLET, FILM COATED ORAL at 08:14

## 2024-03-07 RX ADMIN — POTASSIUM CHLORIDE 40 MEQ: 1500 TABLET, EXTENDED RELEASE ORAL at 12:33

## 2024-03-07 RX ADMIN — Medication 10 ML: at 04:33

## 2024-03-07 RX ADMIN — Medication 10 ML: at 08:14

## 2024-03-07 RX ADMIN — CYANOCOBALAMIN 1000 MCG: 1000 INJECTION, SOLUTION INTRAMUSCULAR; SUBCUTANEOUS at 12:33

## 2024-03-07 RX ADMIN — ASPIRIN 81 MG: 81 TABLET, COATED ORAL at 08:14

## 2024-03-07 RX ADMIN — LOSARTAN POTASSIUM 100 MG: 50 TABLET, FILM COATED ORAL at 08:14

## 2024-03-07 RX ADMIN — IOPAMIDOL 100 ML: 755 INJECTION, SOLUTION INTRAVENOUS at 11:28

## 2024-03-07 NOTE — PLAN OF CARE
Goal Outcome Evaluation:  Attempted to see pt this date and pt d/c'ing. Pt reports he is at baseline. D/w pt regarding if SLC concerns arise, recommend ST evaluation in the OP setting. He expressed understanding.

## 2024-03-07 NOTE — PLAN OF CARE
Fair shift spent, patient, made comfortable in bed, ambulated to bathroom, left stable, care and observation continues    Goal Outcome Evaluation:    Problem: Functional Ability Impaired (Stroke, Ischemic/Transient Ischemic Attack)  Goal: Optimal Functional Ability  Outcome: Ongoing, Progressing     Problem: Sensorimotor Impairment (Stroke, Ischemic/Transient Ischemic Attack)  Goal: Improved Sensorimotor Function  Outcome: Ongoing, Progressing

## 2024-03-07 NOTE — CASE MANAGEMENT/SOCIAL WORK
Case Management Discharge Note      Final Note: Routine Home         Selected Continued Care - Discharged on 3/7/2024 Admission date: 3/5/2024 - Discharge disposition: Home-Health Care [facility]           Transportation Services  Private: Car    Final Discharge Disposition Code: 01 - home or self-care

## 2024-03-07 NOTE — PROGRESS NOTES
LOS: 1 day     Chief Complaint: Left arm numbness       SUBJECTIVE:    History taken from: patient chart family    Interval History: Patient sitting up in chair and feeling okay, waiting on CT chest and then planning for DC home.       Review of Systems   Constitutional: Negative.    Eyes:  Negative for visual disturbance.   Cardiovascular: Negative.    Neurological:  Negative for dizziness, tremors, seizures, syncope, facial asymmetry, speech difficulty, weakness, light-headedness, numbness and headaches.        Pertinent PMH:  has no past medical history on file.   ________________________________________________     OBJECTIVE:    On exam:  GENERAL: NAD  CARDIO: RRR  NEURO:  Oriented x3  EOMI, PERRL, no visual field deficits  CN 2-12 intact, No facial asymmetry  Speech clear without dysarthria  Sensations intact and equal bilaterally  Strength 5/5 and equal in all extremities  No ataxia    ________________________________________________   RESULTS REVIEW    VITAL SIGNS:  Temp:  [97.4 °F (36.3 °C)-98.1 °F (36.7 °C)] 97.6 °F (36.4 °C)  Heart Rate:  [58-85] 85  Resp:  [11-23] 15  BP: (113-166)/() 126/80    LABS:       Lab 03/07/24 0449 03/06/24  0551 03/05/24  1644   WBC 7.50 7.20 7.60   HEMOGLOBIN 13.3 13.0 13.7   HEMATOCRIT 40.0 39.9 41.7   PLATELETS 258 264 261   NEUTROS ABS 3.90 3.20 4.70   LYMPHS ABS 1.80 2.10 1.70   MONOS ABS 0.70 0.70 0.60   EOS ABS 1.00* 1.00* 0.60*   MCV 88.2 90.4 87.7   PROTIME  --   --  11.5   APTT  --   --  28.0         Lab 03/07/24 0449 03/06/24  0551 03/05/24  1644   SODIUM 139 139 140   POTASSIUM 3.6 4.0 4.0   CHLORIDE 99 101 101   CO2 28.0 27.0 29.0   ANION GAP 12.0 11.0 10.0   BUN 11 10 9   CREATININE 1.13 1.03 1.07   EGFR 67.8 75.8 72.4   GLUCOSE 103* 94 119*   CALCIUM 9.6 9.8 10.2   HEMOGLOBIN A1C  --  5.90*  --    TSH  --  2.890  --          Lab 03/05/24  1644   TOTAL PROTEIN 7.6   ALBUMIN 4.1   GLOBULIN 3.5   ALT (SGPT) 9   AST (SGOT) 28   BILIRUBIN 0.6   ALK PHOS 87          Lab 03/06/24  0551 03/05/24  1853 03/05/24  1644   HSTROP T 67* 102* 117*   PROTIME  --   --  11.5   INR  --   --  1.06         Lab 03/06/24  0551   CHOLESTEROL 106   LDL CHOL 50   HDL CHOL 38*   TRIGLYCERIDES 94         Lab 03/05/24  1705 03/05/24  1644   VITAMIN B 12  --  296   ABO TYPING O  --    RH TYPING Positive  --    ANTIBODY SCREEN Negative  --              Lab Results   Component Value Date    TSH 2.890 03/06/2024    LDL 50 03/06/2024    HGBA1C 5.90 (H) 03/06/2024    LPLLMVGN14 296 03/05/2024         IMAGING STUDIES:  MRI Brain Without Contrast    Result Date: 3/6/2024  Impression: 1. Punctate foci of increased diffusion weighted and FLAIR signal in the left occipital lobe without definite decreased ADC signal, findings are suggestive of punctate early subacute infarcts within the left PCA territory. 2. Mild age-related atrophy with mild to moderate chronic microvascular ischemic changes. Electronically Signed: Lynn Angel MD  3/6/2024 10:18 AM EST  Workstation ID: [id number]    CT Angiogram Head w AI Analysis of LVO    Result Date: 3/5/2024  Impression: A few focal areas of severe stenosis to near occlusion noted within the distal right M2/M3 segments as characterized above. Additionally there is severe stenosis to near occlusion of the distal left P1 segment/P2 segment, although the PCA is patent more distally. Please consider follow-up with dedicated MRI of the brain to exclude an acute infarct. Additional mild to moderate intracranial stenoses as characterized above. No significant stenoses within the neck. Incidentally noted ectasia of the ascending thoracic aorta and aneurysmal dilation of the descending thoracic aorta. Electronically Signed: Pablito Childers,   3/5/2024 6:14 PM EST  Workstation ID: [id number]    CT Angiogram Neck    Result Date: 3/5/2024  Impression: A few focal areas of severe stenosis to near occlusion noted within the distal right M2/M3 segments as characterized above.  Additionally there is severe stenosis to near occlusion of the distal left P1 segment/P2 segment, although the PCA is patent more distally. Please consider follow-up with dedicated MRI of the brain to exclude an acute infarct. Additional mild to moderate intracranial stenoses as characterized above. No significant stenoses within the neck. Incidentally noted ectasia of the ascending thoracic aorta and aneurysmal dilation of the descending thoracic aorta. Electronically Signed: Pablito Childers DO  3/5/2024 6:14 PM EST  Workstation ID: [id number]    CT Head Without Contrast Stroke Protocol    Result Date: 3/5/2024  Impression: No acute intracranial pathology. Electronically Signed: Siddhartha Hidalgo MD  3/5/2024 5:50 PM EST  Workstation ID: [id number]    XR Chest 1 View    Result Date: 3/5/2024  Impression: 1.Cardiomegaly. Electronically Signed: Eladio Barron MD  3/5/2024 5:26 PM EST  Workstation ID: [id number]     I reviewed the patient's new clinical results.    ________________________________________________      PROBLEM LIST:    TIA (transient ischemic attack)        ASSESSMENT/PLAN:    Subacute strokes within the left PCA territory. On CTA, patient has severe stenosis, possible occlusion of the left PCA likely 2/2 atherosclerotic disease.   - CT head: No acute findings, no hemorrhage  - MRI brain personally reviewed   - CTA head and neck: Focal areas of soft severe stenosis to near occlusion of the distal right M2 and M3 segments.  Severe stenosis and near occlusion of the distal left P1 and P2.  No significant stenosis of the neck.  - Echo EF is 66 to 70%, negative saline test, there is concern for external compression of the right atrium-SOLIS or CT recommend mended.   -Check CT chest with contrast today  - EKG: SR,, prolong AR interval   - Labs: A1C: 5.90, B12: 296, LDL: 50, TSH: 2.890  - ASA 81 mg and Plavix 75 mg daily   - Crestor 20   - PT/OT/ST as appropriate, Neuro checks per protocol, DVT prophylaxis, Stroke  education     Modification of stroke risk factors:   - BP <130/80, HbA1c <6.5, LDL < 70; b12>500 and smoking cessation if applicable.     2.  Hypertension  - Continue home medications  - BP goal 130/90, avoid hypotension     3.  Hyperlipidemia  - Statin & dietary modifications     4. Intracranial atherosclerotic disease- DAPT, high dose statin, lifestyle modifications     5.  Low B12- recommend > 500 for stroke prevention   - Replacement ordered     Plan  CT Chest today   DAPT, statin, B12 replacement, BP control- monitor 1-2 x daily at home and follow up with PCP (goal <130/90)   Stroke follow up in 1 month- will call with apt. Follow up with PCP in 1 week.         I discussed the patient's findings and my recommendations with patient and family    Vanessa Wyatt, ROCAEL  03/07/24  09:44 EST

## 2024-03-07 NOTE — PLAN OF CARE
Problem: Adjustment to Illness (Stroke, Ischemic/Transient Ischemic Attack)  Goal: Optimal Coping  Outcome: Ongoing, Progressing     Problem: Bowel Elimination Impaired (Stroke, Ischemic/Transient Ischemic Attack)  Goal: Effective Bowel Elimination  Outcome: Ongoing, Progressing     Problem: Cerebral Tissue Perfusion (Stroke, Ischemic/Transient Ischemic Attack)  Goal: Optimal Cerebral Tissue Perfusion  Intervention: Protect and Optimize Cerebral Perfusion  Recent Flowsheet Documentation  Taken 3/7/2024 1200 by Sophy Santana RN  Sensory Stimulation Regulation: care clustered     Problem: Cognitive Impairment (Stroke, Ischemic/Transient Ischemic Attack)  Goal: Optimal Cognitive Function  Outcome: Ongoing, Progressing  Intervention: Optimize Cognitive Function  Recent Flowsheet Documentation  Taken 3/7/2024 1200 by Sophy Santana RN  Sensory Stimulation Regulation: care clustered     Problem: Communication Impairment (Stroke, Ischemic/Transient Ischemic Attack)  Goal: Improved Communication Skills  Outcome: Ongoing, Progressing     Problem: Functional Ability Impaired (Stroke, Ischemic/Transient Ischemic Attack)  Goal: Optimal Functional Ability  Outcome: Ongoing, Progressing  Intervention: Optimize Functional Ability  Recent Flowsheet Documentation  Taken 3/7/2024 1200 by Sophy Santana RN  Activity Management: up in chair     Problem: Respiratory Compromise (Stroke, Ischemic/Transient Ischemic Attack)  Goal: Effective Oxygenation and Ventilation  Outcome: Ongoing, Progressing     Problem: Sensorimotor Impairment (Stroke, Ischemic/Transient Ischemic Attack)  Goal: Improved Sensorimotor Function  Outcome: Ongoing, Progressing  Intervention: Optimize Range of Motion, Motor Control and Function  Recent Flowsheet Documentation  Taken 3/7/2024 1200 by Sophy Santana RN  Positioning/Transfer Devices:   pillows   in use  Intervention: Optimize Sensory and Perceptual Ability  Recent Flowsheet  Documentation  Taken 3/7/2024 1200 by Sophy Santana RN  Pressure Reduction Techniques: frequent weight shift encouraged     Problem: Swallowing Impairment (Stroke, Ischemic/Transient Ischemic Attack)  Goal: Optimal Eating and Swallowing without Aspiration  Outcome: Ongoing, Progressing     Problem: Urinary Elimination Impaired (Stroke, Ischemic/Transient Ischemic Attack)  Goal: Effective Urinary Elimination  Outcome: Ongoing, Progressing   Goal Outcome Evaluation:      Patient had a CT of the chest today. The patient did not have any other tests or procedures. The patient remains on room air and did not have any complaints. The patient is going to be discharged home. Will continue to monitor.

## 2024-03-07 NOTE — PLAN OF CARE
Assessment: Roger Lambert no longer shows any impairments affecting his function nor functional mobility. He has returned to his baseline level, IND with self-care and safe to d/c home. Demonstrated functioning below baseline abilities indicate the need for continued skilled intervention while inpatient. Tolerating session today without incident. Will continue to follow and progress as tolerated.      Plan/Recommendations:   No ongoing therapy recommended post-acute care. No therapy needs.. Pt requires no DME at discharge.      Pt desires Home at discharge. Pt cooperative; agreeable to therapeutic recommendations and plan of care.

## 2024-03-07 NOTE — SIGNIFICANT NOTE
03/07/24 1250   OTHER   Discipline physical therapist   Rehab Time/Intention   Session Not Performed other (see comments)  (hospital d/c pending)   Recommendation   PT - Next Appointment 03/08/24

## 2024-03-07 NOTE — DISCHARGE SUMMARY
Date of Discharge:  3/7/2024    Discharge Diagnosis:   Subacute strokes   Hypertension  Hyperlipidemia  Intracranial atherosclerotic diease  Low B12    Presenting Problem/History of Present Illness  Active Hospital Problems    Diagnosis  POA    **TIA (transient ischemic attack) [G45.9]  Yes      Resolved Hospital Problems   No resolved problems to display.          Hospital Course    Patient is a 73 y.o. male presented with presented to the ER with reports of right arm and leg weakness, and blurry vision yesterday evening when he was sitting in the recliner that resolved after a few minutes. He reports  today around noon he had the same symptoms but did not have blurry vision. He states his left arm and leg felt numb and tingly. Patient found to have subacute strokes within the left PCA territory. On CTA, patient has severe stenosis, possible occlusion of the left PCA likely 2/2 atherosclerotic disease. On his SOLIS there was compression of the right atrium with recommendation of CT chest. CT chest with nodule follow up ct chest in 1 year recommended. He has appointment with PCP in 2 weeks    Procedures Performed         Consults:   Consults       Date and Time Order Name Status Description    3/5/2024  7:11 PM Family Medicine Consult      3/5/2024  4:32 PM Inpatient Neurology Consult Stroke      3/5/2024  4:32 PM Inpatient Neurology Consult Stroke Completed             Pertinent Test Results:    Lab Results (most recent)       Procedure Component Value Units Date/Time    Basic Metabolic Panel [[id number]]  (Abnormal) Collected: 03/07/24 0449    Specimen: Blood Updated: 03/07/24 0539     Glucose 103 mg/dL      BUN 11 mg/dL      Creatinine 1.13 mg/dL      Sodium 139 mmol/L      Potassium 3.6 mmol/L      Chloride 99 mmol/L      CO2 28.0 mmol/L      Calcium 9.6 mg/dL      BUN/Creatinine Ratio 9.7     Anion Gap 12.0 mmol/L      eGFR 67.8 mL/min/1.73     Narrative:      GFR Normal >60  Chronic Kidney Disease <60  Kidney  Failure <15    The GFR formula is only valid for adults with stable renal function between ages 18 and 70.    CBC & Differential [[id number]]  (Abnormal) Collected: 03/07/24 0449    Specimen: Blood Updated: 03/07/24 0517    Narrative:      The following orders were created for panel order CBC & Differential.  Procedure                               Abnormality         Status                     ---------                               -----------         ------                     CBC Auto Differential[[id number]]        Abnormal            Final result                 Please view results for these tests on the individual orders.    CBC Auto Differential [[id number]]  (Abnormal) Collected: 03/07/24 0449    Specimen: Blood Updated: 03/07/24 0517     WBC 7.50 10*3/mm3      RBC 4.53 10*6/mm3      Hemoglobin 13.3 g/dL      Hematocrit 40.0 %      MCV 88.2 fL      MCH 29.4 pg      MCHC 33.4 g/dL      RDW 14.8 %      RDW-SD 48.1 fl      MPV 7.3 fL      Platelets 258 10*3/mm3      Neutrophil % 51.6 %      Lymphocyte % 24.1 %      Monocyte % 9.3 %      Eosinophil % 13.7 %      Basophil % 1.3 %      Neutrophils, Absolute 3.90 10*3/mm3      Lymphocytes, Absolute 1.80 10*3/mm3      Monocytes, Absolute 0.70 10*3/mm3      Eosinophils, Absolute 1.00 10*3/mm3      Basophils, Absolute 0.10 10*3/mm3      nRBC 0.0 /100 WBC     Vitamin B12 [[id number]]  (Normal) Collected: 03/05/24 1644    Specimen: Blood Updated: 03/06/24 1632     Vitamin B-12 296 pg/mL     Narrative:      Results may be falsely increased if patient taking Biotin.      POC Glucose Q6H [[id number]]  (Normal) Collected: 03/06/24 1256    Specimen: Blood Updated: 03/06/24 1258     Glucose 85 mg/dL      Comment: Serial Number: [id number]:  [id number]       Hemoglobin A1c [[id number]]  (Abnormal) Collected: 03/06/24 0551    Specimen: Blood Updated: 03/06/24 1035     Hemoglobin A1C 5.90 %     TSH [[id number]]  (Normal) Collected: 03/06/24 0551    Specimen: Blood Updated:  03/06/24 1028     TSH 2.890 uIU/mL     Lipid Panel [[id number]]  (Abnormal) Collected: 03/06/24 0551    Specimen: Blood Updated: 03/06/24 1025     Total Cholesterol 106 mg/dL      Triglycerides 94 mg/dL      HDL Cholesterol 38 mg/dL      LDL Cholesterol  50 mg/dL      VLDL Cholesterol 18 mg/dL      LDL/HDL Ratio 1.29    Narrative:      Cholesterol Reference Ranges  (U.S. Department of Health and Human Services ATP III Classifications)    Desirable          <200 mg/dL  Borderline High    200-239 mg/dL  High Risk          >240 mg/dL      Triglyceride Reference Ranges  (U.S. Department of Health and Human Services ATP III Classifications)    Normal           <150 mg/dL  Borderline High  150-199 mg/dL  High             200-499 mg/dL  Very High        >500 mg/dL    HDL Reference Ranges  (U.S. Department of Health and Human Services ATP III Classifications)    Low     <40 mg/dl (major risk factor for CHD)  High    >60 mg/dl ('negative' risk factor for CHD)        LDL Reference Ranges  (U.S. Department of Health and Human Services ATP III Classifications)    Optimal          <100 mg/dL  Near Optimal     100-129 mg/dL  Borderline High  130-159 mg/dL  High             160-189 mg/dL  Very High        >189 mg/dL    Basic Metabolic Panel [[id number]]  (Normal) Collected: 03/06/24 0551    Specimen: Blood Updated: 03/06/24 0641     Glucose 94 mg/dL      BUN 10 mg/dL      Creatinine 1.03 mg/dL      Sodium 139 mmol/L      Potassium 4.0 mmol/L      Comment: Slight hemolysis detected by analyzer. Result may be falsely elevated.        Chloride 101 mmol/L      CO2 27.0 mmol/L      Calcium 9.8 mg/dL      BUN/Creatinine Ratio 9.7     Anion Gap 11.0 mmol/L      eGFR 75.8 mL/min/1.73     Narrative:      GFR Normal >60  Chronic Kidney Disease <60  Kidney Failure <15    The GFR formula is only valid for adults with stable renal function between ages 18 and 70.    High Sensitivity Troponin T [[id number]]  (Abnormal) Collected: 03/06/24 0551     Specimen: Blood Updated: 03/06/24 0640     HS Troponin T 67 ng/L     Narrative:      High Sensitive Troponin T Reference Range:  <14.0 ng/L- Negative Female for AMI  <22.0 ng/L- Negative Male for AMI  >=14 - Abnormal Female indicating possible myocardial injury.  >=22 - Abnormal Male indicating possible myocardial injury.   Clinicians would have to utilize clinical acumen, EKG, Troponin, and serial changes to determine if it is an Acute Myocardial Infarction or myocardial injury due to an underlying chronic condition.         CBC & Differential [[id number]]  (Abnormal) Collected: 03/06/24 0551    Specimen: Blood Updated: 03/06/24 0616    Narrative:      The following orders were created for panel order CBC & Differential.  Procedure                               Abnormality         Status                     ---------                               -----------         ------                     CBC Auto Differential[[id number]]        Abnormal            Final result                 Please view results for these tests on the individual orders.    CBC Auto Differential [[id number]]  (Abnormal) Collected: 03/06/24 0551    Specimen: Blood Updated: 03/06/24 0616     WBC 7.20 10*3/mm3      RBC 4.42 10*6/mm3      Hemoglobin 13.0 g/dL      Hematocrit 39.9 %      MCV 90.4 fL      MCH 29.4 pg      MCHC 32.5 g/dL      RDW 15.1 %      RDW-SD 47.7 fl      MPV 7.6 fL      Platelets 264 10*3/mm3      Neutrophil % 44.7 %      Lymphocyte % 29.7 %      Monocyte % 9.6 %      Eosinophil % 13.9 %      Basophil % 2.1 %      Neutrophils, Absolute 3.20 10*3/mm3      Lymphocytes, Absolute 2.10 10*3/mm3      Monocytes, Absolute 0.70 10*3/mm3      Eosinophils, Absolute 1.00 10*3/mm3      Basophils, Absolute 0.20 10*3/mm3      nRBC 0.1 /100 WBC     High Sensitivity Troponin T 2Hr [[id number]]  (Abnormal) Collected: 03/05/24 1853    Specimen: Blood Updated: 03/05/24 1920     HS Troponin T 102 ng/L      Troponin T Delta -15 ng/L     Narrative:       High Sensitive Troponin T Reference Range:  <14.0 ng/L- Negative Female for AMI  <22.0 ng/L- Negative Male for AMI  >=14 - Abnormal Female indicating possible myocardial injury.  >=22 - Abnormal Male indicating possible myocardial injury.   Clinicians would have to utilize clinical acumen, EKG, Troponin, and serial changes to determine if it is an Acute Myocardial Infarction or myocardial injury due to an underlying chronic condition.         [place] Draw [[id number]] Collected: 03/05/24 1644    Specimen: Blood Updated: 03/05/24 1745    Narrative:      The following orders were created for panel order [place] Draw.  Procedure                               Abnormality         Status                     ---------                               -----------         ------                     Green Top (Gel)[[id number]]                                  Final result               Lavender Top[[id number]]                                     Final result               Gold Top - SST[[id number]]                                   Final result               Light Blue Top[[id number]]                                   Final result                 Please view results for these tests on the individual orders.    Lavender Top [[id number]] Collected: 03/05/24 1644    Specimen: Blood Updated: 03/05/24 1745     Extra Tube hold for add-on     Comment: Auto resulted       Light Blue Top [[id number]] Collected: 03/05/24 1644    Specimen: Blood Updated: 03/05/24 1745     Extra Tube Hold for add-ons.     Comment: Auto resulted       Green Top (Gel) [[id number]] Collected: 03/05/24 1644    Specimen: Blood Updated: 03/05/24 1714     Extra Tube hold    Single High Sensitivity Troponin T [[id number]]  (Abnormal) Collected: 03/05/24 1644    Specimen: Blood Updated: 03/05/24 1711     HS Troponin T 117 ng/L     Narrative:      High Sensitive Troponin T Reference Range:  <14.0 ng/L- Negative Female for AMI  <22.0 ng/L- Negative Male for AMI  >=14 -  Abnormal Female indicating possible myocardial injury.  >=22 - Abnormal Male indicating possible myocardial injury.   Clinicians would have to utilize clinical acumen, EKG, Troponin, and serial changes to determine if it is an Acute Myocardial Infarction or myocardial injury due to an underlying chronic condition.         Comprehensive Metabolic Panel [[id number]]  (Abnormal) Collected: 03/05/24 1644    Specimen: Blood Updated: 03/05/24 1710     Glucose 119 mg/dL      BUN 9 mg/dL      Creatinine 1.07 mg/dL      Sodium 140 mmol/L      Potassium 4.0 mmol/L      Chloride 101 mmol/L      CO2 29.0 mmol/L      Calcium 10.2 mg/dL      Total Protein 7.6 g/dL      Albumin 4.1 g/dL      ALT (SGPT) 9 U/L      AST (SGOT) 28 U/L      Alkaline Phosphatase 87 U/L      Total Bilirubin 0.6 mg/dL      Globulin 3.5 gm/dL      A/G Ratio 1.2 g/dL      BUN/Creatinine Ratio 8.4     Anion Gap 10.0 mmol/L      eGFR 72.4 mL/min/1.73     Narrative:      GFR Normal >60  Chronic Kidney Disease <60  Kidney Failure <15    The GFR formula is only valid for adults with stable renal function between ages 18 and 70.    Protime-INR [[id number]]  (Normal) Collected: 03/05/24 1644    Specimen: Blood Updated: 03/05/24 1705     Protime 11.5 Seconds      INR 1.06    aPTT [[id number]]  (Normal) Collected: 03/05/24 1644    Specimen: Blood Updated: 03/05/24 1705     PTT 28.0 seconds     Gold Top - SST [[id number]] Collected: 03/05/24 1644    Specimen: Blood Updated: 03/05/24 1658    POC Glucose Once [[id number]]  (Abnormal) Collected: 03/05/24 1652    Specimen: Blood Updated: 03/05/24 1654     Glucose 110 mg/dL      Comment: Serial Number: [id number]:  [id number]                Results for orders placed during the hospital encounter of 03/05/24    Adult Transthoracic Echo Complete W/ Cont if Necessary Per Protocol    Interpretation Summary    Left ventricular systolic function is normal. Left ventricular ejection fraction appears to be 66 - 70%.    Left  ventricular diastolic function is consistent with (grade I) impaired relaxation.    The left atrial cavity is mildly dilated.    Saline test results are negative.    The right atrial cavity is small in size.    Estimated right ventricular systolic pressure from tricuspid regurgitation is normal (<35 mmHg).    The right sided structures are ill-defined. The right atrium is very small and there are some images that suggest there may be external compression. Visualization with CT or SOLIS is recommended for better dilineation of structures.    The study is technically difficult for diagnosis. The quality of the study is limited due to patient body habitus.         Condition on Discharge:  Stable    Vital Signs  Temp:  [97.6 °F (36.4 °C)-98.1 °F (36.7 °C)] 97.7 °F (36.5 °C)  Heart Rate:  [58-85] 63  Resp:  [11-22] 22  BP: (113-158)/() 137/78      Physical Exam  Vitals reviewed.   Constitutional:       Appearance: He is not ill-appearing.   HENT:      Head: Normocephalic and atraumatic.      Right Ear: External ear normal.      Left Ear: External ear normal.      Nose: Nose normal.      Mouth/Throat:      Mouth: Mucous membranes are moist.   Eyes:      General:         Right eye: No discharge.         Left eye: No discharge.   Cardiovascular:      Rate and Rhythm: Normal rate and regular rhythm.      Pulses: Normal pulses.      Heart sounds: Normal heart sounds.   Pulmonary:      Effort: Pulmonary effort is normal.      Breath sounds: Normal breath sounds.   Abdominal:      General: Bowel sounds are normal.      Palpations: Abdomen is soft.   Musculoskeletal:         General: Normal range of motion.      Cervical back: Normal range of motion.   Skin:     General: Skin is warm and dry.   Neurological:      Mental Status: He is alert and oriented to person, place, and time.   Psychiatric:         Behavior: Behavior normal.              Discharge Disposition  Home-Health Care Svc    Discharge Medications     Discharge  Medications        New Medications        Instructions Start Date   aspirin 81 MG EC tablet   81 mg, Oral, Daily   Start Date: March 8, 2024     clopidogrel 75 MG tablet  Commonly known as: PLAVIX   75 mg, Oral, Daily   Start Date: March 8, 2024     famotidine 40 MG tablet  Commonly known as: PEPCID   40 mg, Oral, Daily   Start Date: March 8, 2024            Changes to Medications        Instructions Start Date   rosuvastatin 20 MG tablet  Commonly known as: CRESTOR  What changed:   medication strength  how much to take   20 mg, Oral, Daily   Start Date: March 8, 2024            Continue These Medications        Instructions Start Date   irbesartan-hydrochlorothiazide 150-12.5 MG tablet  Commonly known as: AVALIDE   0.5 tablets, Oral, Daily      latanoprost 0.005 % ophthalmic solution  Commonly known as: XALATAN   1 drop, Both Eyes, Nightly               Discharge Diet:   Diet Instructions       Diet: Cardiac Diets; Healthy Heart (2-3 Na+); Regular (IDDSI 7); Thin (IDDSI 0)      Discharge Diet: Cardiac Diets    Cardiac Diet: Healthy Heart (2-3 Na+)    Texture: Regular (IDDSI 7)    Fluid Consistency: Thin (IDDSI 0)            Activity at Discharge:   Activity Instructions       Gradually Increase Activity Until at Pre-Hospitalization Level              Follow-up Appointments  No future appointments.  Additional Instructions for the Follow-ups that You Need to Schedule       Discharge Follow-up with PCP   As directed       Currently Documented PCP:    Abel Perez MD    PCP Phone Number:    [phone]     Follow Up Details: appointment on 3/14 at 1pm                Test Results Pending at Discharge       ROCAEL Rudolph  03/07/24  12:41 EST    Time: Discharge 25 min

## 2024-03-07 NOTE — THERAPY DISCHARGE NOTE
Subjective: Pt agreeable to therapeutic plan of care.  Cognition: oriented to Person, Place, Time, and Situation    Objective:     Bed Mobility: Modified-Independent   Functional Transfers: Independent     Balance: unsupported, static, dynamic, and standing Independent  Functional Ambulation: Independent    Toileting: Independent  ADL Position: unsupported standing    Grooming: Independent  ADL Position: unsupported standing and at sink  ADL Comments: Oral care, hair care, hand hygiene    Vitals: WNL    Pain: 0 VAS  Location: na  Interventions for pain: N/A  Education: Provided education on the importance of mobility in the acute care setting, Verbal/Tactile Cues, ADL training, and Transfer Training      Assessment: Roger Lambert no longer shows any impairments affecting his function nor functional mobility. He has returned to his baseline level, IND with self-care and safe to d/c home. Demonstrated functioning below baseline abilities indicate the need for continued skilled intervention while inpatient. Tolerating session today without incident. Will continue to follow and progress as tolerated.     Plan/Recommendations:   No ongoing therapy recommended post-acute care. No therapy needs.. Pt requires no DME at discharge.     Pt desires Home at discharge. Pt cooperative; agreeable to therapeutic recommendations and plan of care.     Modified Tor[place]: 1 = No significant disability (Able to carry out all usual activities, despite some symptoms)    Post-Tx Position: Up in Chair and Call light and personal items within reach  PPE: gloves

## 2024-03-08 NOTE — PAYOR COMM NOTE
"Discharge notification for case# [id number]     ------------------------    THANK YOU,    TRACI Edwards, RN  Utilization Review  [facility]  Phone: [phone]  Fax: [phone]      NPI: [id number]  Tax ID: [id number]        Roger Bernardo (73 y.o. Male)       Date of Birth   1950    Social Security Number       Address   [address] [place] [address] [place] [address]    Home Phone   [phone]    MRN   [id number]       [employer]ar[employer]   Yazidism    Marital Status                               Admission Date   3/5/24    Admission Type   Emergency    Admitting Provider   Ashley Root MD    Attending Provider       Department, Room/Bed   [facility] NEURO, 247/1       Discharge Date   3/7/2024    Discharge Disposition   Home-Health Care Svc    Discharge Destination                                 Attending Provider: (none)   Allergies: No Known Allergies    Isolation: None   Infection: None   Code Status: Prior    Ht: 182.9 cm (72\")   Wt: 124 kg (273 lb)    Admission Cmt: None   Principal Problem: TIA (transient ischemic attack) [G45.9]                   Active Insurance as of 3/5/2024       Primary Coverage       Payor Plan Insurance Group Employer/Plan Group    AETNA MEDICARE REPLACEMENT [place] [id number]       Payor Plan Address Payor Plan Phone Number Payor Plan Fax Number Effective Dates    [address] [phone]  1/1/2024 - None Entered    [facility] [id number]         Subscriber Name Subscriber Birth Date Member ID       ROGER BERNARDO 1950 [id number]                     Emergency Contacts        (Rel.) Home Phone Work Phone Mobile Phone    KYJESSIE SOMMER (Sister) -- -- [phone]    BELLO DOMÍNGUEZ (Sister) -- -- [phone]                 Discharge Summary        Nadia Diaz, APRN at 03/07/24 1130       Attestation signed by Ashley Root MD at 03/07/24 1600    I have reviewed this documentation and agree.       "              Date of Discharge:  3/7/2024    Discharge Diagnosis:   Subacute strokes   Hypertension  Hyperlipidemia  Intracranial atherosclerotic diease  Low B12    Presenting Problem/History of Present Illness  Active Hospital Problems    Diagnosis  POA    **TIA (transient ischemic attack) [G45.9]  Yes      Resolved Hospital Problems   No resolved problems to display.          Hospital Course    Patient is a 73 y.o. male presented with presented to the ER with reports of right arm and leg weakness, and blurry vision yesterday evening when he was sitting in the recliner that resolved after a few minutes. He reports  today around noon he had the same symptoms but did not have blurry vision. He states his left arm and leg felt numb and tingly. Patient found to have subacute strokes within the left PCA territory. On CTA, patient has severe stenosis, possible occlusion of the left PCA likely 2/2 atherosclerotic disease. On his SOLIS there was compression of the right atrium with recommendation of CT chest. CT chest with nodule follow up ct chest in 1 year recommended. He has appointment with PCP in 2 weeks    Procedures Performed         Consults:   Consults       Date and Time Order Name Status Description    3/5/2024  7:11 PM Family Medicine Consult      3/5/2024  4:32 PM Inpatient Neurology Consult Stroke      3/5/2024  4:32 PM Inpatient Neurology Consult Stroke Completed             Pertinent Test Results:    Lab Results (most recent)       Procedure Component Value Units Date/Time    Basic Metabolic Panel [[id number]]  (Abnormal) Collected: 03/07/24 0449    Specimen: Blood Updated: 03/07/24 0539     Glucose 103 mg/dL      BUN 11 mg/dL      Creatinine 1.13 mg/dL      Sodium 139 mmol/L      Potassium 3.6 mmol/L      Chloride 99 mmol/L      CO2 28.0 mmol/L      Calcium 9.6 mg/dL      BUN/Creatinine Ratio 9.7     Anion Gap 12.0 mmol/L      eGFR 67.8 mL/min/1.73     Narrative:      GFR Normal >60  Chronic Kidney Disease  <60  Kidney Failure <15    The GFR formula is only valid for adults with stable renal function between ages 18 and 70.    CBC & Differential [[id number]]  (Abnormal) Collected: 03/07/24 0449    Specimen: Blood Updated: 03/07/24 0517    Narrative:      The following orders were created for panel order CBC & Differential.  Procedure                               Abnormality         Status                     ---------                               -----------         ------                     CBC Auto Differential[[id number]]        Abnormal            Final result                 Please view results for these tests on the individual orders.    CBC Auto Differential [[id number]]  (Abnormal) Collected: 03/07/24 0449    Specimen: Blood Updated: 03/07/24 0517     WBC 7.50 10*3/mm3      RBC 4.53 10*6/mm3      Hemoglobin 13.3 g/dL      Hematocrit 40.0 %      MCV 88.2 fL      MCH 29.4 pg      MCHC 33.4 g/dL      RDW 14.8 %      RDW-SD 48.1 fl      MPV 7.3 fL      Platelets 258 10*3/mm3      Neutrophil % 51.6 %      Lymphocyte % 24.1 %      Monocyte % 9.3 %      Eosinophil % 13.7 %      Basophil % 1.3 %      Neutrophils, Absolute 3.90 10*3/mm3      Lymphocytes, Absolute 1.80 10*3/mm3      Monocytes, Absolute 0.70 10*3/mm3      Eosinophils, Absolute 1.00 10*3/mm3      Basophils, Absolute 0.10 10*3/mm3      nRBC 0.0 /100 WBC     Vitamin B12 [[id number]]  (Normal) Collected: 03/05/24 1644    Specimen: Blood Updated: 03/06/24 1632     Vitamin B-12 296 pg/mL     Narrative:      Results may be falsely increased if patient taking Biotin.      POC Glucose Q6H [[id number]]  (Normal) Collected: 03/06/24 1256    Specimen: Blood Updated: 03/06/24 1258     Glucose 85 mg/dL      Comment: Serial Number: [id number]:  [id number]       Hemoglobin A1c [[id number]]  (Abnormal) Collected: 03/06/24 0551    Specimen: Blood Updated: 03/06/24 1035     Hemoglobin A1C 5.90 %     TSH [[id number]]  (Normal) Collected: 03/06/24 0551    Specimen: Blood  Updated: 03/06/24 1028     TSH 2.890 uIU/mL     Lipid Panel [[id number]]  (Abnormal) Collected: 03/06/24 0551    Specimen: Blood Updated: 03/06/24 1025     Total Cholesterol 106 mg/dL      Triglycerides 94 mg/dL      HDL Cholesterol 38 mg/dL      LDL Cholesterol  50 mg/dL      VLDL Cholesterol 18 mg/dL      LDL/HDL Ratio 1.29    Narrative:      Cholesterol Reference Ranges  (U.S. Department of Health and Human Services ATP III Classifications)    Desirable          <200 mg/dL  Borderline High    200-239 mg/dL  High Risk          >240 mg/dL      Triglyceride Reference Ranges  (U.S. Department of Health and Human Services ATP III Classifications)    Normal           <150 mg/dL  Borderline High  150-199 mg/dL  High             200-499 mg/dL  Very High        >500 mg/dL    HDL Reference Ranges  (U.S. Department of Health and Human Services ATP III Classifications)    Low     <40 mg/dl (major risk factor for CHD)  High    >60 mg/dl ('negative' risk factor for CHD)        LDL Reference Ranges  (U.S. Department of Health and Human Services ATP III Classifications)    Optimal          <100 mg/dL  Near Optimal     100-129 mg/dL  Borderline High  130-159 mg/dL  High             160-189 mg/dL  Very High        >189 mg/dL    Basic Metabolic Panel [[id number]]  (Normal) Collected: 03/06/24 0551    Specimen: Blood Updated: 03/06/24 0641     Glucose 94 mg/dL      BUN 10 mg/dL      Creatinine 1.03 mg/dL      Sodium 139 mmol/L      Potassium 4.0 mmol/L      Comment: Slight hemolysis detected by analyzer. Result may be falsely elevated.        Chloride 101 mmol/L      CO2 27.0 mmol/L      Calcium 9.8 mg/dL      BUN/Creatinine Ratio 9.7     Anion Gap 11.0 mmol/L      eGFR 75.8 mL/min/1.73     Narrative:      GFR Normal >60  Chronic Kidney Disease <60  Kidney Failure <15    The GFR formula is only valid for adults with stable renal function between ages 18 and 70.    High Sensitivity Troponin T [[id number]]  (Abnormal) Collected: 03/06/24  0551    Specimen: Blood Updated: 03/06/24 0640     HS Troponin T 67 ng/L     Narrative:      High Sensitive Troponin T Reference Range:  <14.0 ng/L- Negative Female for AMI  <22.0 ng/L- Negative Male for AMI  >=14 - Abnormal Female indicating possible myocardial injury.  >=22 - Abnormal Male indicating possible myocardial injury.   Clinicians would have to utilize clinical acumen, EKG, Troponin, and serial changes to determine if it is an Acute Myocardial Infarction or myocardial injury due to an underlying chronic condition.         CBC & Differential [[id number]]  (Abnormal) Collected: 03/06/24 0551    Specimen: Blood Updated: 03/06/24 0616    Narrative:      The following orders were created for panel order CBC & Differential.  Procedure                               Abnormality         Status                     ---------                               -----------         ------                     CBC Auto Differential[[id number]]        Abnormal            Final result                 Please view results for these tests on the individual orders.    CBC Auto Differential [[id number]]  (Abnormal) Collected: 03/06/24 0551    Specimen: Blood Updated: 03/06/24 0616     WBC 7.20 10*3/mm3      RBC 4.42 10*6/mm3      Hemoglobin 13.0 g/dL      Hematocrit 39.9 %      MCV 90.4 fL      MCH 29.4 pg      MCHC 32.5 g/dL      RDW 15.1 %      RDW-SD 47.7 fl      MPV 7.6 fL      Platelets 264 10*3/mm3      Neutrophil % 44.7 %      Lymphocyte % 29.7 %      Monocyte % 9.6 %      Eosinophil % 13.9 %      Basophil % 2.1 %      Neutrophils, Absolute 3.20 10*3/mm3      Lymphocytes, Absolute 2.10 10*3/mm3      Monocytes, Absolute 0.70 10*3/mm3      Eosinophils, Absolute 1.00 10*3/mm3      Basophils, Absolute 0.20 10*3/mm3      nRBC 0.1 /100 WBC     High Sensitivity Troponin T 2Hr [[id number]]  (Abnormal) Collected: 03/05/24 1853    Specimen: Blood Updated: 03/05/24 1920     HS Troponin T 102 ng/L      Troponin T Delta -15 ng/L      Narrative:      High Sensitive Troponin T Reference Range:  <14.0 ng/L- Negative Female for AMI  <22.0 ng/L- Negative Male for AMI  >=14 - Abnormal Female indicating possible myocardial injury.  >=22 - Abnormal Male indicating possible myocardial injury.   Clinicians would have to utilize clinical acumen, EKG, Troponin, and serial changes to determine if it is an Acute Myocardial Infarction or myocardial injury due to an underlying chronic condition.         Ewing Draw [[id number]] Collected: 03/05/24 1644    Specimen: Blood Updated: 03/05/24 1745    Narrative:      The following orders were created for panel order Ewing Draw.  Procedure                               Abnormality         Status                     ---------                               -----------         ------                     Green Top (Gel)[[id number]]                                  Final result               Lavender Top[[id number]]                                     Final result               Gold Top - SST[[id number]]                                   Final result               Light Blue Top[[id number]]                                   Final result                 Please view results for these tests on the individual orders.    Lavender Top [[id number]] Collected: 03/05/24 1644    Specimen: Blood Updated: 03/05/24 1745     Extra Tube hold for add-on     Comment: Auto resulted       Light Blue Top [[id number]] Collected: 03/05/24 1644    Specimen: Blood Updated: 03/05/24 1745     Extra Tube Hold for add-ons.     Comment: Auto resulted       Green Top (Gel) [[id number]] Collected: 03/05/24 1644    Specimen: Blood Updated: 03/05/24 1714     Extra Tube hold    Single High Sensitivity Troponin T [[id number]]  (Abnormal) Collected: 03/05/24 1644    Specimen: Blood Updated: 03/05/24 1711     HS Troponin T 117 ng/L     Narrative:      High Sensitive Troponin T Reference Range:  <14.0 ng/L- Negative Female for AMI  <22.0 ng/L- Negative Male for  AMI  >=14 - Abnormal Female indicating possible myocardial injury.  >=22 - Abnormal Male indicating possible myocardial injury.   Clinicians would have to utilize clinical acumen, EKG, Troponin, and serial changes to determine if it is an Acute Myocardial Infarction or myocardial injury due to an underlying chronic condition.         Comprehensive Metabolic Panel [[id number]]  (Abnormal) Collected: 03/05/24 1644    Specimen: Blood Updated: 03/05/24 1710     Glucose 119 mg/dL      BUN 9 mg/dL      Creatinine 1.07 mg/dL      Sodium 140 mmol/L      Potassium 4.0 mmol/L      Chloride 101 mmol/L      CO2 29.0 mmol/L      Calcium 10.2 mg/dL      Total Protein 7.6 g/dL      Albumin 4.1 g/dL      ALT (SGPT) 9 U/L      AST (SGOT) 28 U/L      Alkaline Phosphatase 87 U/L      Total Bilirubin 0.6 mg/dL      Globulin 3.5 gm/dL      A/G Ratio 1.2 g/dL      BUN/Creatinine Ratio 8.4     Anion Gap 10.0 mmol/L      eGFR 72.4 mL/min/1.73     Narrative:      GFR Normal >60  Chronic Kidney Disease <60  Kidney Failure <15    The GFR formula is only valid for adults with stable renal function between ages 18 and 70.    Protime-INR [[id number]]  (Normal) Collected: 03/05/24 1644    Specimen: Blood Updated: 03/05/24 1705     Protime 11.5 Seconds      INR 1.06    aPTT [[id number]]  (Normal) Collected: 03/05/24 1644    Specimen: Blood Updated: 03/05/24 1705     PTT 28.0 seconds     Gold Top - SST [[id number]] Collected: 03/05/24 1644    Specimen: Blood Updated: 03/05/24 1658    POC Glucose Once [[id number]]  (Abnormal) Collected: 03/05/24 1652    Specimen: Blood Updated: 03/05/24 1654     Glucose 110 mg/dL      Comment: Serial Number: [id number]:  [id number]                Results for orders placed during the hospital encounter of 03/05/24    Adult Transthoracic Echo Complete W/ Cont if Necessary Per Protocol    Interpretation Summary    Left ventricular systolic function is normal. Left ventricular ejection fraction appears to be 66 -  70%.    Left ventricular diastolic function is consistent with (grade I) impaired relaxation.    The left atrial cavity is mildly dilated.    Saline test results are negative.    The right atrial cavity is small in size.    Estimated right ventricular systolic pressure from tricuspid regurgitation is normal (<35 mmHg).    The right sided structures are ill-defined. The right atrium is very small and there are some images that suggest there may be external compression. Visualization with CT or SOLIS is recommended for better dilineation of structures.    The study is technically difficult for diagnosis. The quality of the study is limited due to patient body habitus.         Condition on Discharge:  Stable    Vital Signs  Temp:  [97.6 °F (36.4 °C)-98.1 °F (36.7 °C)] 97.7 °F (36.5 °C)  Heart Rate:  [58-85] 63  Resp:  [11-22] 22  BP: (113-158)/() 137/78      Physical Exam  Vitals reviewed.   Constitutional:       Appearance: He is not ill-appearing.   HENT:      Head: Normocephalic and atraumatic.      Right Ear: External ear normal.      Left Ear: External ear normal.      Nose: Nose normal.      Mouth/Throat:      Mouth: Mucous membranes are moist.   Eyes:      General:         Right eye: No discharge.         Left eye: No discharge.   Cardiovascular:      Rate and Rhythm: Normal rate and regular rhythm.      Pulses: Normal pulses.      Heart sounds: Normal heart sounds.   Pulmonary:      Effort: Pulmonary effort is normal.      Breath sounds: Normal breath sounds.   Abdominal:      General: Bowel sounds are normal.      Palpations: Abdomen is soft.   Musculoskeletal:         General: Normal range of motion.      Cervical back: Normal range of motion.   Skin:     General: Skin is warm and dry.   Neurological:      Mental Status: He is alert and oriented to person, place, and time.   Psychiatric:         Behavior: Behavior normal.              Discharge Disposition  Home-Health Care Svc    Discharge Medications      Discharge Medications        New Medications        Instructions Start Date   aspirin 81 MG EC tablet   81 mg, Oral, Daily   Start Date: March 8, 2024     clopidogrel 75 MG tablet  Commonly known as: PLAVIX   75 mg, Oral, Daily   Start Date: March 8, 2024     famotidine 40 MG tablet  Commonly known as: PEPCID   40 mg, Oral, Daily   Start Date: March 8, 2024            Changes to Medications        Instructions Start Date   rosuvastatin 20 MG tablet  Commonly known as: CRESTOR  What changed:   medication strength  how much to take   20 mg, Oral, Daily   Start Date: March 8, 2024            Continue These Medications        Instructions Start Date   irbesartan-hydrochlorothiazide 150-12.5 MG tablet  Commonly known as: AVALIDE   0.5 tablets, Oral, Daily      latanoprost 0.005 % ophthalmic solution  Commonly known as: XALATAN   1 drop, Both Eyes, Nightly               Discharge Diet:   Diet Instructions       Diet: Cardiac Diets; Healthy Heart (2-3 Na+); Regular (IDDSI 7); Thin (IDDSI 0)      Discharge Diet: Cardiac Diets    Cardiac Diet: Healthy Heart (2-3 Na+)    Texture: Regular (IDDSI 7)    Fluid Consistency: Thin (IDDSI 0)            Activity at Discharge:   Activity Instructions       Gradually Increase Activity Until at Pre-Hospitalization Level              Follow-up Appointments  No future appointments.  Additional Instructions for the Follow-ups that You Need to Schedule       Discharge Follow-up with PCP   As directed       Currently Documented PCP:    Abel Perez MD    PCP Phone Number:    [phone]     Follow Up Details: appointment on 3/14 at 1pm                Test Results Pending at Discharge       ROCAEL Rudolph  03/07/24  12:41 EST    Time: Discharge 25 min          Electronically signed by Ashley Root MD at 03/07/24 1600       Discharge Order (From admission, onward)       Start     Ordered    03/07/24 1240  Discharge patient  Once        Expected Discharge Date: 03/07/24    Expected Discharge Time: Afternoon   Discharge Disposition: Home-Health Care [facility]   Physician of Record for Attribution - Please select from Treatment Team: VIANEY FAGAN [[id number]]   Review needed by CMO to determine Physician of Record: No   Please choose which facility the patient is currently admitted if they are being discharged to another facility or unit.: [facility]      Question Answer Comment   Physician of Record for Attribution - Please select from Treatment Team VIANEY FAGAN    Review needed by CMO to determine Physician of Record No    Please choose which facility the patient is currently admitted if they are being discharged to another facility or unit. [facility]        03/07/24 1244

## 2024-03-08 NOTE — OUTREACH NOTE
Prep Survey      Flowsheet Row Responses   [facility] facility patient discharged from? [facility]   Is LACE score < 7 ? Yes   Eligibility Readm Mgmt   Discharge diagnosis transient ischemic attack   Does the patient have one of the following disease processes/diagnoses(primary or secondary)? Stroke   Does the patient have Home health ordered? No   Is there a DME ordered? No   Prep survey completed? Yes            JOAQUIM ROSE - Registered Nurse

## 2024-03-11 ENCOUNTER — READMISSION MANAGEMENT (OUTPATIENT)
Dept: CALL CENTER | Facility: HOSPITAL | Age: 74
End: 2024-03-11
Payer: MEDICARE

## 2024-03-11 NOTE — OUTREACH NOTE
Stroke Week 1 Survey      Flowsheet Row Responses   [facility] facility patient discharged from? [facility]   Does the patient have one of the following disease processes/diagnoses(primary or secondary)? Stroke   Week 1 attempt successful? No   Unsuccessful attempts Attempt 1            Lynn FRANKS - Registered Nurse

## 2024-03-15 ENCOUNTER — READMISSION MANAGEMENT (OUTPATIENT)
Dept: CALL CENTER | Facility: HOSPITAL | Age: 74
End: 2024-03-15
Payer: MEDICARE

## 2024-03-15 NOTE — OUTREACH NOTE
Stroke Week 1 Survey      Flowsheet Row Responses   [facility] patient discharged from? [facility]   Does the patient have one of the following disease processes/diagnoses(primary or secondary)? Stroke   Week 1 attempt successful? Yes   Call start time 1520   Call end time 1523   Discharge diagnosis Subacute strokes   Hypertension  Hyperlipidemia  Intracranial atherosclerotic diease  Low B12   Person spoke with today (if not patient) and relationship Patient   Meds reviewed with patient/caregiver? Yes   Does the patient have all medications ordered at discharge? Yes   Is the patient taking all medications as directed (includes completed medication regime)? Yes   Does the patient have a primary care provider?  Yes   Does the patient have an appointment with their PCP within 7 days of discharge? Yes   Comments regarding PCP Patient reports that he had follow up with PCP yesterday   Has the patient kept scheduled appointments due by today? Yes   What is the Home health agency?  Declined HH   Has home health visited the patient within 72 hours of discharge? N/A   Psychosocial issues? No   Does the patient require any assistance with activities of daily living such as eating, bathing, dressing, walking, etc.? No   Does the patient have any residual symptoms from stroke/TIA? Yes   Residual symptoms comments Numbness to right forearm   Does the patient understand the diet ordered at discharge? Yes   Did the patient receive a copy of their discharge instructions? Yes   Nursing interventions Reviewed instructions with patient   What is the patient's perception of their health status since discharge? Improving   Nursing interventions Nurse provided patient education   Is the patient/caregiver able to teach back the risk factors for a stroke? High blood pressure-goal below 120/80, Carotid or other artery disease, High Cholesterol   Is the patient/caregiver able to teach back signs and symptoms related to disease process for  when to call PCP? Yes   Is the patient/caregiver able to teach back signs and symptoms related to disease process for when to call 911? Yes   If the patient is a current smoker, are they able to teach back resources for cessation? Not a smoker   Is the patient/caregiver able to teach back the hierarchy of who to call/visit for symptoms/problems? PCP, Specialist, Home health nurse, Urgent Care, ED, 911 Yes   Is the patient able to teach back FAST for Stroke? B alance: Watch for sudden loss of balance, E yes: Check for vision loss, F ace: Look for an uneven smile, A rm: Check if one arm is weak, S peech: Listen for slurred speech, T bianca: Call 9-1-1 right away  [Reviewed these stroke symptoms with patient and informed to call 911 with any new or worsening symptoms.]   Week 1 call completed? Yes   Is the patient interested in additional calls from an ambulatory ? No   Would this patient benefit from a Referral to [facility] Social Work? No   Call end time 1523            JOAQUIM FRANCIS - Registered Nurse

## 2024-03-25 ENCOUNTER — READMISSION MANAGEMENT (OUTPATIENT)
Dept: CALL CENTER | Facility: HOSPITAL | Age: 74
End: 2024-03-25
Payer: MEDICARE

## 2024-03-25 NOTE — OUTREACH NOTE
Stroke Week 2 Survey      Flowsheet Row Responses   [facility] patient discharged from? [facility]   Does the patient have one of the following disease processes/diagnoses(primary or secondary)? Stroke   Week 2 attempt successful? Yes   Call start time 1526   Call end time 1527   Discharge diagnosis Subacute strokes   Hypertension  Hyperlipidemia  Intracranial atherosclerotic diease  Low B12   Person spoke with today (if not patient) and relationship Patient   Meds reviewed with patient/caregiver? Yes   Is the patient having any side effects they believe may be caused by any medication additions or changes? No   Does the patient have all medications ordered at discharge? Yes   Is the patient taking all medications as directed (includes completed medication regime)? Yes   Does the patient have a primary care provider?  Yes   Does the patient have an appointment with their PCP within 7 days of discharge? Yes   Comments regarding PCP Patient reports that he had follow up with PCP yesterday   Has the patient kept scheduled appointments due by today? Yes   Psychosocial issues? No   Does the patient require any assistance with activities of daily living such as eating, bathing, dressing, walking, etc.? No   Does the patient have any residual symptoms from stroke/TIA? No   Does the patient understand the diet ordered at discharge? Yes   Did the patient receive a copy of their discharge instructions? Yes   Nursing interventions Reviewed instructions with patient   What is the patient's perception of their health status since discharge? Improving   Nursing interventions Nurse provided patient education   Is the patient able to teach back FAST for Stroke? B alance: Watch for sudden loss of balance, E yes: Check for vision loss, F ace: Look for an uneven smile, A rm: Check if one arm is weak, S peech: Listen for slurred speech, T bianca: Call 9-1-1 right away   Is the patient/caregiver able to teach back the risk factors for a  stroke? High blood pressure-goal below 120/80, Carotid or other artery disease, High Cholesterol   Is the patient/caregiver able to teach back signs and symptoms related to disease process for when to call PCP? Yes   Is the patient/caregiver able to teach back signs and symptoms related to disease process for when to call 911? Yes   If the patient is a current smoker, are they able to teach back resources for cessation? Not a smoker   Is the patient/caregiver able to teach back the hierarchy of who to call/visit for symptoms/problems? PCP, Specialist, Home health nurse, Urgent Care, ED, 911 Yes   Week 2 call completed? Yes   Is the patient interested in additional calls from an ambulatory ? No   Would this patient benefit from a Referral to Amb Social Work? No   Call end time 1527            RUPAL FERNANDEZ - Registered Nurse

## 2024-04-22 ENCOUNTER — OFFICE VISIT (OUTPATIENT)
Dept: WOUND CARE | Facility: HOSPITAL | Age: 74
End: 2024-04-22
Payer: MEDICARE

## 2024-04-22 DIAGNOSIS — G90.09 OTHER IDIOPATHIC PERIPHERAL AUTONOMIC NEUROPATHY: Primary | ICD-10-CM

## 2024-04-22 DIAGNOSIS — R73.9 HYPERGLYCEMIA, UNSPECIFIED: ICD-10-CM

## 2024-04-22 DIAGNOSIS — L97.102: ICD-10-CM

## 2024-04-22 DIAGNOSIS — L84 CORNS AND CALLOSITIES: ICD-10-CM

## 2024-04-22 PROCEDURE — G0463 HOSPITAL OUTPT CLINIC VISIT: HCPCS

## 2024-04-29 ENCOUNTER — TRANSCRIBE ORDERS (OUTPATIENT)
Dept: ADMINISTRATIVE | Facility: HOSPITAL | Age: 74
End: 2024-04-29
Payer: MEDICARE

## 2024-04-29 ENCOUNTER — HOSPITAL ENCOUNTER (OUTPATIENT)
Dept: GENERAL RADIOLOGY | Facility: HOSPITAL | Age: 74
Discharge: HOME | End: 2024-04-29
Payer: MEDICARE

## 2024-04-29 ENCOUNTER — OFFICE VISIT (OUTPATIENT)
Dept: WOUND CARE | Facility: HOSPITAL | Age: 74
End: 2024-04-29
Payer: MEDICARE

## 2024-04-29 DIAGNOSIS — L97.522 ULCER OF LEFT FOOT, WITH FAT LAYER EXPOSED: Primary | ICD-10-CM

## 2024-04-29 DIAGNOSIS — R73.9 HYPERGLYCEMIA, UNSPECIFIED: ICD-10-CM

## 2024-04-29 DIAGNOSIS — L97.102: ICD-10-CM

## 2024-04-29 DIAGNOSIS — L97.522 ULCER OF LEFT FOOT, WITH FAT LAYER EXPOSED: ICD-10-CM

## 2024-04-29 DIAGNOSIS — L84 CORNS AND CALLOSITIES: ICD-10-CM

## 2024-04-29 DIAGNOSIS — G90.09 OTHER IDIOPATHIC PERIPHERAL AUTONOMIC NEUROPATHY: Primary | ICD-10-CM

## 2024-04-29 PROCEDURE — 73630 X-RAY EXAM OF FOOT: CPT

## 2024-05-13 ENCOUNTER — OFFICE VISIT (OUTPATIENT)
Dept: WOUND CARE | Facility: HOSPITAL | Age: 74
End: 2024-05-13
Payer: MEDICARE

## 2024-05-13 DIAGNOSIS — L97.102: ICD-10-CM

## 2024-05-13 DIAGNOSIS — M21.279: ICD-10-CM

## 2024-05-13 DIAGNOSIS — G90.09 OTHER IDIOPATHIC PERIPHERAL AUTONOMIC NEUROPATHY: Primary | ICD-10-CM

## 2024-05-13 DIAGNOSIS — L84 CORNS AND CALLOSITIES: ICD-10-CM

## 2024-05-13 DIAGNOSIS — R73.9 HYPERGLYCEMIA, UNSPECIFIED: ICD-10-CM

## 2024-05-28 ENCOUNTER — OFFICE VISIT (OUTPATIENT)
Dept: PODIATRY | Facility: CLINIC | Age: 74
End: 2024-05-28
Payer: MEDICARE

## 2024-05-28 VITALS — WEIGHT: 272 LBS | HEIGHT: 72 IN | RESPIRATION RATE: 20 BRPM | BODY MASS INDEX: 36.84 KG/M2

## 2024-05-28 DIAGNOSIS — L97.522 CHRONIC ULCER OF GREAT TOE OF LEFT FOOT WITH FAT LAYER EXPOSED: Primary | ICD-10-CM

## 2024-05-28 DIAGNOSIS — I87.303 CHRONIC VENOUS HYPERTENSION WITHOUT COMPLICATIONS, BILATERAL: ICD-10-CM

## 2024-05-28 DIAGNOSIS — G62.89 OTHER POLYNEUROPATHY: ICD-10-CM

## 2024-05-28 DIAGNOSIS — M20.5X2 ACQUIRED MALLET TOE, LEFT: ICD-10-CM

## 2024-05-28 PROCEDURE — 1160F RVW MEDS BY RX/DR IN RCRD: CPT | Performed by: PODIATRIST

## 2024-05-28 PROCEDURE — 99213 OFFICE O/P EST LOW 20 MIN: CPT | Performed by: PODIATRIST

## 2024-05-28 PROCEDURE — 1159F MED LIST DOCD IN RCRD: CPT | Performed by: PODIATRIST

## 2024-05-28 NOTE — PROGRESS NOTES
05/28/2024  Foot and Ankle Surgery - New Patient   Provider: Dr. Jr Baeza DPM  Location: [facility]    Subjective:  Roger Lambert is a 73 y.o. male.     Chief Complaint   Patient presents with    Left Foot - Wound Check, Initial Evaluation    Initial Evaluation     MATY Perez md 4/8/2024       HPI:   The patient is a 73-year-old male who presents for evaluation of a wound on his left foot. He is accompanied by his sister.    The [facility] in early 04/2024, where he was informed that he is in the early stages of neuropathy. He has been under the care of a nurse practitioner at the [facility] since 04/2024. The wound has been present for several months. The patient suspects a prediabetic status and is currently on anticoagulant therapy. He experiences back discomfort when standing, but reports occasional numbness in his feet. He has hard skin on his feet, having spent approximately 50 years on his feet. He has never undergone chemotherapy. An x-ray of his foot was performed, and he is not aware of any previous fractures to the big toe joint. He is currently unemployed. He has worn arch support shoes for several years. This is the third or fourth time the wound has opened. His sister inquires about the possibility of having his toenails trimmed.    No Known Allergies    Past Medical History:   Diagnosis Date    HTN (hypertension)     Stroke        History reviewed. No pertinent surgical history.    Family History   Problem Relation Age of Onset    No Known Problems Mother        Social History     Socioeconomic History    Marital status:    Tobacco Use    Smoking status: Never     Passive exposure: Never    Smokeless tobacco: Never   Vaping Use    Vaping status: Never Used   Substance and Sexual Activity    Alcohol use: Never    Drug use: Never    Sexual activity: Never        Current Outpatient Medications on File Prior to Visit   Medication Sig  "Dispense Refill    aspirin 81 MG EC tablet Take 1 tablet by mouth Daily. 30 tablet 1    clopidogrel (PLAVIX) 75 MG tablet Take 1 tablet by mouth Daily. 30 tablet 1    famotidine (PEPCID) 40 MG tablet Take 1 tablet by mouth Daily. 30 tablet 1    irbesartan-hydrochlorothiazide (AVALIDE) 150-12.5 MG tablet Take 0.5 tablets by mouth Daily.      latanoprost (XALATAN) 0.005 % ophthalmic solution Administer 1 drop to both eyes Every Night.      rosuvastatin (CRESTOR) 20 MG tablet Take 1 tablet by mouth Daily. 90 tablet 1     No current facility-administered medications on file prior to visit.       Review of Systems:  General: Denies fever, chills, fatigue, and weakness.  Eyes: Denies vision loss, blurry vision, and excessive redness.  ENT: Denies hearing issues and difficulty swallowing.  Cardiovascular: Denies palpitations, chest pain, or syncopal episodes.  Respiratory: Denies shortness of breath, wheezing, and coughing.  GI: Denies abdominal pain, nausea, and vomiting.   : Denies frequency, hematuria, and urgency.  Musculoskeletal: Denies muscle cramps, joint pains, and stiffness.  Derm: Denies rash, open wounds, or suspicious lesions.  Neuro: Denies headaches, numbness, loss of coordination, and tremors.  Psych: Denies anxiety and depression.  Endocrine: Denies temperature intolerance and changes in appetite.  Heme: Denies bleeding disorders or abnormal bruising.     Objective   Resp 20   Ht 182.9 cm (72\")   Wt 123 kg (272 lb)   BMI 36.89 kg/m²     Foot/Ankle Exam    GENERAL  Orientation:  AAOx3  Affect:  appropriate    VASCULAR     Right Foot Vascularity   Normal vascular exam    Dorsalis pedis:  2+  Posterior tibial:  2+  Skin temperature:  warm  Edema grading:  None  CFT:  < 3 seconds  Pedal hair growth:  Present  Varicosities:  none     Left Foot Vascularity   Normal vascular exam    Dorsalis pedis:  2+  Posterior tibial:  2+  Skin temperature:  warm  Edema grading:  None  CFT:  < 3 seconds  Pedal hair " growth:  Present  Varicosities:  none     NEUROLOGIC     Right Foot Neurologic   Light touch sensation: normal  Hot/Cold sensation: normal  Achilles reflex:  2+     Left Foot Neurologic   Light touch sensation: normal  Hot/Cold sensation:  normal  Achilles reflex:  2+    MUSCULOSKELETAL     Right Foot Musculoskeletal   Arch:  Normal     Left Foot Musculoskeletal   Arch:  Normal    MUSCLE STRENGTH     Right Foot Muscle Strength   Normal strength    Foot dorsiflexion:  5  Foot plantar flexion:  5  Foot inversion:  5  Foot eversion:  5     Left Foot Muscle Strength   Normal strength    Foot dorsiflexion:  5  Foot plantar flexion:  5  Foot inversion:  5  Foot eversion:  5    DERMATOLOGIC      Right Foot Dermatologic   Skin  Right foot skin is intact.   Nails comment:  Nails 1-5     Left Foot Dermatologic   Skin  Left foot skin is intact.   Nails comment:  Nails 1-5    TESTS     Right Foot Tests   Anterior drawer: negative  Varus tilt: negative     Left Foot Tests   Anterior drawer: negative  Varus tilt: negative     Right foot additional comments: Moderate varicosities are observed in both lower extremities. Pes planus foot structure is observed on the right foot. No open wounds or signs of infection are observed on the right foot. Mallet toe deformity is present with rigidity involving the IPJ region of the right foot. Equinus contracture is present with the knee extended and flexed.     Left foot additional comments: An open, stable-appearing wound is present involving the plantar medial aspect of the left great toe at the interphalangeal joint region. The wound extends to subcutaneous tissue and measures approximately 1.5 x 0.5 cm.       Assessment & Plan   Diagnoses and all orders for this visit:    1. Chronic ulcer of great toe of left foot with fat layer exposed (Primary)    2. Acquired mallet toe, left    3. Other polyneuropathy    4. Chronic venous hypertension without complications, bilateral      Upon  examination, the wound does not appear to be of significant concern. A comprehensive discussion was held with the patient regarding the various causes of neuropathy, including back issues, diabetes, alcoholism, syphilis, drug-induced or chemotherapy-induced causes. The possibility of idiopathic neuropathy was also discussed. The patient was advised to utilize a walking boot to offload his body weight by approximately 10 to 15 percent. The use of arch support for his right foot was suggested, however, it was concluded that it would not address the wound immediately. The initial approach would involve placing the patient in a boot and performing Betadine dressing changes to maintain dryness and promote healing. The patient was informed that the wound will not heal unless altering the activity. A CAM boot was provided to the patient, which should be worn as much as possible when the patient is up and on his feet. The patient was informed that he can shower and bathe. The patient was informed that if the wound continues to open, it may cause further problems.    Greater than 30 minutes spent before, during, and after evaluation for patient care.    Follow-up  The patient is scheduled for a follow-up visit in 2 weeks.    No orders of the defined types were placed in this encounter.       Note is dictated utilizing voice recognition software. Unfortunately this leads to occasional typographical errors. I apologize in advance if the situation occurs. If questions occur please do not hesitate to call our office.    Transcribed from ambient dictation for REBECCA Baeza DPM by Kay Warner.  05/28/24   14:42 EDT    Patient or patient representative verbalized consent to the visit recording.  I have personally performed the services described in this document as transcribed by the above individual, and it is both accurate and complete.

## 2024-06-18 ENCOUNTER — OFFICE VISIT (OUTPATIENT)
Dept: PODIATRY | Facility: CLINIC | Age: 74
End: 2024-06-18
Payer: MEDICARE

## 2024-06-18 VITALS
RESPIRATION RATE: 20 BRPM | WEIGHT: 272 LBS | HEIGHT: 72 IN | OXYGEN SATURATION: 98 % | BODY MASS INDEX: 36.84 KG/M2 | HEART RATE: 72 BPM

## 2024-06-18 DIAGNOSIS — L97.522 CHRONIC ULCER OF GREAT TOE OF LEFT FOOT WITH FAT LAYER EXPOSED: Primary | ICD-10-CM

## 2024-06-18 DIAGNOSIS — M20.5X2 ACQUIRED MALLET TOE, LEFT: ICD-10-CM

## 2024-06-18 DIAGNOSIS — I87.303 CHRONIC VENOUS HYPERTENSION WITHOUT COMPLICATIONS, BILATERAL: ICD-10-CM

## 2024-06-18 DIAGNOSIS — G62.89 OTHER POLYNEUROPATHY: ICD-10-CM

## 2024-06-18 PROCEDURE — 1160F RVW MEDS BY RX/DR IN RCRD: CPT | Performed by: PODIATRIST

## 2024-06-18 PROCEDURE — 99213 OFFICE O/P EST LOW 20 MIN: CPT | Performed by: PODIATRIST

## 2024-06-18 PROCEDURE — 1159F MED LIST DOCD IN RCRD: CPT | Performed by: PODIATRIST

## 2024-06-18 NOTE — PROGRESS NOTES
"06/18/2024  Foot and Ankle Surgery - Established Patient/Follow-up  Provider: Dr. Jr Baeza DPM  Location: [facility]    Subjective:  Roger Lambert is a 73 y.o. male.     Chief Complaint   Patient presents with    Left Foot - Follow-up, Wound Check    Follow-up     MATY Perez last pcp visit 04/08/2024       History of Present Illness  The patient presents for evaluation of a wound on his toe. He is accompanied by an adult female.    The patient reports a slight improvement in his wound since his last visit, despite the use of a boot. He has been managing the wound with Biotene. This is the fourth occurrence of this issue. He has requested pads and dressing supplies, which were ordered by the wound care center. However, he has cancelled the appointment due to scheduling conflicts. He has been changing the dressing twice daily. The accompanying adult female queries whether the patient's neuropathy could be contributing to slow wound healing.      No Known Allergies    Current Outpatient Medications on File Prior to Visit   Medication Sig Dispense Refill    aspirin 81 MG EC tablet Take 1 tablet by mouth Daily. 30 tablet 1    clopidogrel (PLAVIX) 75 MG tablet Take 1 tablet by mouth Daily. 30 tablet 1    famotidine (PEPCID) 40 MG tablet Take 1 tablet by mouth Daily. 30 tablet 1    irbesartan-hydrochlorothiazide (AVALIDE) 150-12.5 MG tablet Take 0.5 tablets by mouth Daily.      latanoprost (XALATAN) 0.005 % ophthalmic solution Administer 1 drop to both eyes Every Night.      rosuvastatin (CRESTOR) 20 MG tablet Take 1 tablet by mouth Daily. 90 tablet 1     No current facility-administered medications on file prior to visit.       Objective   Pulse 72   Resp 20   Ht 182.9 cm (72\")   Wt 123 kg (272 lb)   SpO2 98%   BMI 36.89 kg/m²     Foot/Ankle Exam  Physical Exam  GENERAL  Orientation:  AAOx3  Affect:  appropriate     VASCULAR      Right Foot Vascularity   Normal vascular exam    Dorsalis pedis:  " 2+  Posterior tibial:  2+  Skin temperature:  warm  Edema grading:  None  CFT:  < 3 seconds  Pedal hair growth:  Present  Varicosities:  none      Left Foot Vascularity   Normal vascular exam    Dorsalis pedis:  2+  Posterior tibial:  2+  Skin temperature:  warm  Edema grading:  None  CFT:  < 3 seconds  Pedal hair growth:  Present  Varicosities:  none     NEUROLOGIC      Right Foot Neurologic   Light touch sensation: normal  Hot/Cold sensation: normal  Achilles reflex:  2+      Left Foot Neurologic   Light touch sensation: normal  Hot/Cold sensation:  normal  Achilles reflex:  2+     MUSCULOSKELETAL      Right Foot Musculoskeletal   Arch:  Normal      Left Foot Musculoskeletal   Arch:  Normal     MUSCLE STRENGTH      Right Foot Muscle Strength   Normal strength    Foot dorsiflexion:  5  Foot plantar flexion:  5  Foot inversion:  5  Foot eversion:  5      Left Foot Muscle Strength   Normal strength    Foot dorsiflexion:  5  Foot plantar flexion:  5  Foot inversion:  5  Foot eversion:  5     DERMATOLOGIC       Right Foot Dermatologic   Skin  Right foot skin is intact.   Nails comment:  Nails 1-5      Left Foot Dermatologic   Skin  Left foot skin is intact.   Nails comment:  Nails 1-5     TESTS      Right Foot Tests   Anterior drawer: negative  Varus tilt: negative      Left Foot Tests   Anterior drawer: negative  Varus tilt: negative     Right foot additional comments: Moderate varicosities are observed in both lower extremities. Pes planus foot structure is observed on the right foot. No open wounds or signs of infection are observed on the right foot. Mallet toe deformity is present with rigidity involving the IPJ region of the right foot. Equinus contracture is present with the knee extended and flexed.     Left foot additional comments: An open, stable-appearing wound is present involving the plantar medial aspect of the left great toe at the interphalangeal joint region. The wound extends to subcutaneous tissue  and measures approximately 1.5 x 0.5 cm.      6/18/24: Patient returns with wound improving to the plantar medial aspect of the left great toe.  Wound is granular.  No tunneling or tracking no signs of infection.  No maceration or extensive drainage.      Results  Imaging  X-ray of the foot shows no signs of infection or concern.    Assessment & Plan   Diagnoses and all orders for this visit:    1. Chronic ulcer of great toe of left foot with fat layer exposed (Primary)    2. Acquired mallet toe, left    3. Other polyneuropathy    4. Chronic venous hypertension without complications, bilateral      Assessment & Plan    The patient is advised to persist with the use of the boot and apply Betadine to the wound once daily for the ensuing 2 weeks. A prescription for [employer] supplies will be issued. In the event of drainage from the wound, the patient is advised to change the dressing twice daily.  Patient is to continue to monitor the toe closely and call with any progressive issues or concerns.  I did discuss long-term management options which may require operative intervention.  We did discuss possibility of IPJ fusion involving the great toe versus partial toe amputation for quicker recovery.  Patient does not want to consider any type of surgery at this time.  I have asked that he remain in the cam boot with decreased overall activity and follow-up with nurse practitioner in 2 weeks.  Greater than 20 minutes spent before, during, and after evaluation for patient care.                 Patient or patient representative verbalized consent for the use of Ambient Listening during the visit with  REBECCA Baeza DPM for chart documentation. 6/18/2024  13:37 EDT    REBECCA Baeza DPM

## 2024-07-16 ENCOUNTER — OFFICE VISIT (OUTPATIENT)
Dept: PODIATRY | Facility: CLINIC | Age: 74
End: 2024-07-16
Payer: MEDICARE

## 2024-07-16 VITALS
BODY MASS INDEX: 36.84 KG/M2 | HEART RATE: 67 BPM | OXYGEN SATURATION: 96 % | RESPIRATION RATE: 20 BRPM | WEIGHT: 272 LBS | HEIGHT: 72 IN

## 2024-07-16 DIAGNOSIS — M20.5X2 ACQUIRED MALLET TOE, LEFT: ICD-10-CM

## 2024-07-16 DIAGNOSIS — G62.89 OTHER POLYNEUROPATHY: ICD-10-CM

## 2024-07-16 DIAGNOSIS — R26.81 UNSTEADINESS ON FEET: ICD-10-CM

## 2024-07-16 DIAGNOSIS — M79.675 PAIN IN TOES OF BOTH FEET: ICD-10-CM

## 2024-07-16 DIAGNOSIS — B35.1 ONYCHOMYCOSIS: ICD-10-CM

## 2024-07-16 DIAGNOSIS — L97.522 CHRONIC ULCER OF GREAT TOE OF LEFT FOOT WITH FAT LAYER EXPOSED: Primary | ICD-10-CM

## 2024-07-16 DIAGNOSIS — M79.674 PAIN IN TOES OF BOTH FEET: ICD-10-CM

## 2024-07-16 NOTE — PROGRESS NOTES
"07/16/2024  Foot and Ankle Surgery - Established Patient/Follow-up  Provider: ROCAEL Belcher   Location: [facility]    Subjective:  Roger Lambert is a 73 y.o. male.     Chief Complaint   Patient presents with    Left Foot - Follow-up, Wound Check    Follow-up     MATY Perez last pcp visit 04/08/2024       History of Present Illness  The patient is a 73-year-old male who is known to this provider, established with Dr. Baeza, here for follow-up of left foot wound and nail care. He is accompanied by an adult female.    He reports an improvement in his condition. He has been applying Betadine spray to the wound at least twice daily as prescribed by Dr. Baeza. He has been making an effort to keep his toe dry and has Hydrofiber dressings at home. The wound has shown significant improvement. He expresses a desire to have his nails trimmed today. He is borderline diabetic but has never been informed of being diabetic and is not currently on any medication for it. He denies any symptoms of redness, drainage, increased pain, foul odor, fever, or chills. He has a history of frequent ingrown toenails.        No Known Allergies    Current Outpatient Medications on File Prior to Visit   Medication Sig Dispense Refill    aspirin 81 MG EC tablet Take 1 tablet by mouth Daily. 30 tablet 1    clopidogrel (PLAVIX) 75 MG tablet Take 1 tablet by mouth Daily. 30 tablet 1    famotidine (PEPCID) 40 MG tablet Take 1 tablet by mouth Daily. 30 tablet 1    irbesartan-hydrochlorothiazide (AVALIDE) 150-12.5 MG tablet Take 0.5 tablets by mouth Daily.      latanoprost (XALATAN) 0.005 % ophthalmic solution Administer 1 drop to both eyes Every Night.      rosuvastatin (CRESTOR) 20 MG tablet Take 1 tablet by mouth Daily. 90 tablet 1     No current facility-administered medications on file prior to visit.       Objective   Pulse 67   Resp 20   Ht 182.9 cm (72\")   Wt 123 kg (272 lb)   SpO2 96%   BMI 36.89 kg/m²     Foot/Ankle " Exam    GENERAL  Appearance:  appears stated age  Orientation:  AAOx3  Affect:  appropriate  Gait:  partial weight bearing  Assistance:  independent  Right shoe gear: CAM boot  Left shoe gear: casual shoe and sock    VASCULAR     Right Foot Vascularity   Dorsalis pedis:  1+  Skin temperature:  warm  Edema grading:  Trace  CFT:  < 3 seconds  Pedal hair growth:  Present     Left Foot Vascularity   Dorsalis pedis:  1+  Skin temperature:  warm  Edema grading:  Trace  CFT:  < 3 seconds  Pedal hair growth:  Present     NEUROLOGIC     Right Foot Neurologic   Light touch sensation: diminished     Left Foot Neurologic   Light touch sensation: diminished    MUSCULOSKELETAL     Right Foot Musculoskeletal   Ecchymosis:  none  Tenderness:  toenail problem       Left Foot Musculoskeletal   Ecchymosis:  none  Tenderness:  toenail problem  Hallux limitus: Yes      DERMATOLOGIC      Right Foot Dermatologic   Skin  Positive for skin changes.   Nails  1.  Positive for elongated, onychomycosis, abnormal thickness and dystrophic nail.  2.  Positive for elongated, onychomycosis, abnormal thickness and dystrophic nail.  3.  Positive for elongated, onychomycosis, abnormal thickness and dystrophic nail.  4.  Positive for elongated, onychomycosis, abnormal thickness and dystrophic nail.  5.  Positive for elongated, onychomycosis, abnormal thickness and dystrophic nail.     Left Foot Dermatologic   Skin  Positive for corn, skin changes and ulcer.   Nails  1.  Positive for elongated, onychomycosis, abnormal thickness and dystrophic nail.  2.  Positive for elongated, onychomycosis, abnormal thickness and dystrophic nail.  3.  Positive for elongated, onychomycosis, abnormal thickness and dystrophic nail.  4.  Positive for elongated, onychomycosis, abnormally thick and dystrophic nail.  5.  Positive for elongated, onychomycosis, abnormally thick and dystrophic nail.  Physical Exam  Ulcer on the left great toe appears stable, measuring 1.2 cm by 1  cm by 0.2 cm before debridement. The wound bed is pink, moist, and clean. There is a periwound with callus, otherwise clear, dry, and intact. Post-debridement measurements are 1.5 cm by 1.1 cm by 0.1 cm.       Results       Assessment & Plan   Diagnoses and all orders for this visit:    1. Chronic ulcer of great toe of left foot with fat layer exposed (Primary)  -     Ambulatory Referral to Wound Clinic    2. Acquired mallet toe, left    3. Other polyneuropathy    4. Onychomycosis    5. Pain in toes of both feet    6. Unsteadiness on feet      Assessment & Plan  1. Left foot wound and nail care.  The left great toe ulcer appears stable, measuring 1.2 cm by 1 cm by 0.2 cm prior to debridement. The wound bed is pink, moist, and clean. The periwound with callus is otherwise clear, dry, and intact. Post-debridement measurements include 1.5 cm by 1.1 cm. Offloading with a CAM walking boot and decreased activity are recommended. Betadine paint with Hydrofiber dressing and a large Band-Aid are recommended, to be changed 1 to 2 times daily. Surgical offloading may be necessary for healing. The patient is advised to continue monitoring for any signs or symptoms of worsening wound or infection, and to contact the office or seek urgent care or emergency department should they occur before the scheduled appointment.    Sharp Debridement to left great toe    Pre ulcer measurement: 1.2x1x0.2cm    Post ulcer measurement: 1.2x1.1x0.1cm    Anesthesia: None, as patient is neuropathic    Bleeding: <5cc    Pre-op pain: 0    Post-op pain: 0    Complications: None    Consent and time out was performed before proceeding with the procedure.  Sharp debridement to the level of subcutaneous tissue performed with a curette without complication.  Periwound callus and biofilm was excised to a healthy  base.  No purulence or proximal extension was identified. Hemostasis was achieved with pressure.  Dry sterile dressings were applied.  Patient  tolerated the procedure well.     Follow-up  The patient will follow up with the wound center in 3 weeks.       Orders Placed This Encounter   Procedures    Ambulatory Referral to Wound Clinic     Referral Priority:   Routine     Referral Type:   Consultation     Referral Reason:   Specialty Services Required     Referred to Provider:   Nichol Duran APRN     Number of Visits Requested:   1          Patient or patient representative verbalized consent for the use of Ambient Listening during the visit with  ROCAEL Kaufman for chart documentation. 7/16/2024  13:21 EDT

## 2024-08-07 ENCOUNTER — OFFICE VISIT (OUTPATIENT)
Dept: WOUND CARE | Facility: HOSPITAL | Age: 74
End: 2024-08-07
Payer: MEDICARE

## 2024-08-07 PROCEDURE — G0463 HOSPITAL OUTPT CLINIC VISIT: HCPCS

## 2024-08-21 ENCOUNTER — OFFICE VISIT (OUTPATIENT)
Dept: WOUND CARE | Facility: HOSPITAL | Age: 74
End: 2024-08-21
Payer: MEDICARE

## 2024-09-11 ENCOUNTER — OFFICE VISIT (OUTPATIENT)
Dept: WOUND CARE | Facility: HOSPITAL | Age: 74
End: 2024-09-11
Payer: MEDICARE

## 2024-10-02 ENCOUNTER — OFFICE VISIT (OUTPATIENT)
Dept: WOUND CARE | Facility: HOSPITAL | Age: 74
End: 2024-10-02
Payer: MEDICARE

## 2024-10-14 ENCOUNTER — OFFICE VISIT (OUTPATIENT)
Dept: WOUND CARE | Facility: HOSPITAL | Age: 74
End: 2024-10-14
Payer: MEDICARE

## 2024-11-04 ENCOUNTER — OFFICE VISIT (OUTPATIENT)
Dept: WOUND CARE | Facility: HOSPITAL | Age: 74
End: 2024-11-04
Payer: MEDICARE

## 2024-11-11 ENCOUNTER — OFFICE VISIT (OUTPATIENT)
Dept: WOUND CARE | Facility: HOSPITAL | Age: 74
End: 2024-11-11
Payer: MEDICARE

## 2024-11-25 ENCOUNTER — OFFICE VISIT (OUTPATIENT)
Dept: WOUND CARE | Facility: HOSPITAL | Age: 74
End: 2024-11-25
Payer: MEDICARE

## 2024-12-16 ENCOUNTER — OFFICE VISIT (OUTPATIENT)
Dept: WOUND CARE | Facility: HOSPITAL | Age: 74
End: 2024-12-16
Payer: MEDICARE

## 2024-12-16 ENCOUNTER — LAB REQUISITION (OUTPATIENT)
Dept: LAB | Facility: HOSPITAL | Age: 74
End: 2024-12-16
Payer: MEDICARE

## 2024-12-16 DIAGNOSIS — L97.522 NON-PRESSURE CHRONIC ULCER OF OTHER PART OF LEFT FOOT WITH FAT LAYER EXPOSED: ICD-10-CM

## 2024-12-16 PROCEDURE — 87205 SMEAR GRAM STAIN: CPT

## 2024-12-16 PROCEDURE — 87147 CULTURE TYPE IMMUNOLOGIC: CPT

## 2024-12-16 PROCEDURE — 87186 SC STD MICRODIL/AGAR DIL: CPT

## 2024-12-16 PROCEDURE — 87070 CULTURE OTHR SPECIMN AEROBIC: CPT

## 2024-12-17 ENCOUNTER — TRANSCRIBE ORDERS (OUTPATIENT)
Dept: ADMINISTRATIVE | Facility: HOSPITAL | Age: 74
End: 2024-12-17
Payer: MEDICARE

## 2024-12-17 ENCOUNTER — HOSPITAL ENCOUNTER (OUTPATIENT)
Dept: GENERAL RADIOLOGY | Facility: HOSPITAL | Age: 74
Discharge: HOME | End: 2024-12-17
Payer: MEDICARE

## 2024-12-17 DIAGNOSIS — L97.522 ULCER OF LEFT FOOT, WITH FAT LAYER EXPOSED: ICD-10-CM

## 2024-12-17 DIAGNOSIS — L97.522 ULCER OF LEFT FOOT, WITH FAT LAYER EXPOSED: Primary | ICD-10-CM

## 2024-12-17 PROCEDURE — 73630 X-RAY EXAM OF FOOT: CPT

## 2024-12-19 LAB
BACTERIA SPEC AEROBE CULT: ABNORMAL
BACTERIA SPEC AEROBE CULT: ABNORMAL
GRAM STN SPEC: ABNORMAL

## 2024-12-20 ENCOUNTER — TELEPHONE (OUTPATIENT)
Age: 74
End: 2024-12-20
Payer: MEDICARE

## 2024-12-23 ENCOUNTER — OFFICE VISIT (OUTPATIENT)
Dept: WOUND CARE | Facility: HOSPITAL | Age: 74
End: 2024-12-23
Payer: MEDICARE

## 2025-01-13 ENCOUNTER — OFFICE VISIT (OUTPATIENT)
Dept: WOUND CARE | Facility: HOSPITAL | Age: 75
End: 2025-01-13
Payer: MEDICARE

## 2025-01-13 PROCEDURE — G0463 HOSPITAL OUTPT CLINIC VISIT: HCPCS

## 2025-01-15 ENCOUNTER — OFFICE VISIT (OUTPATIENT)
Age: 75
End: 2025-01-15
Payer: MEDICARE

## 2025-01-15 VITALS — HEART RATE: 68 BPM | BODY MASS INDEX: 36.84 KG/M2 | WEIGHT: 272 LBS | HEIGHT: 72 IN | OXYGEN SATURATION: 99 %

## 2025-01-15 DIAGNOSIS — L97.524 CHRONIC ULCER OF GREAT TOE OF LEFT FOOT WITH NECROSIS OF BONE: Primary | ICD-10-CM

## 2025-01-15 DIAGNOSIS — G62.89 OTHER POLYNEUROPATHY: ICD-10-CM

## 2025-01-15 DIAGNOSIS — M20.5X2 ACQUIRED MALLET TOE, LEFT: ICD-10-CM

## 2025-01-15 DIAGNOSIS — M86.172 ACUTE OSTEOMYELITIS OF LEFT FOOT: ICD-10-CM

## 2025-01-16 PROBLEM — L97.524 CHRONIC ULCER OF GREAT TOE OF LEFT FOOT WITH NECROSIS OF BONE: Status: ACTIVE | Noted: 2025-01-15

## 2025-01-16 PROBLEM — M86.172 ACUTE OSTEOMYELITIS OF LEFT FOOT: Status: ACTIVE | Noted: 2025-01-15

## 2025-01-16 NOTE — H&P (VIEW-ONLY)
"01/15/2025  Foot and Ankle Surgery - Established Patient/Follow-up  Provider: Dr. Jr Baeza DPM  Location: [facility]    Subjective:  Roger Lambert is a 74 y.o. male.     Chief Complaint   Patient presents with    Left Foot - Follow-up, Wound Check     Osteomyelitis - left 2nd toe - SX consult       History of Present Illness  The patient presents for evaluation of his left great toe.    He was last seen at the wound care center on Monday, where he reported an improvement in his condition following a course of antibiotics initiated in December. He experienced a similar level of drainage as before, accompanied by tightness and redness in the area. He has not undergone any MRI scans to date.      No Known Allergies    Current Outpatient Medications on File Prior to Visit   Medication Sig Dispense Refill    aspirin 81 MG EC tablet Take 1 tablet by mouth Daily. 30 tablet 1    clopidogrel (PLAVIX) 75 MG tablet Take 1 tablet by mouth Daily. 30 tablet 1    famotidine (PEPCID) 40 MG tablet Take 1 tablet by mouth Daily. 30 tablet 1    irbesartan-hydrochlorothiazide (AVALIDE) 150-12.5 MG tablet Take 0.5 tablets by mouth Daily.      latanoprost (XALATAN) 0.005 % ophthalmic solution Administer 1 drop to both eyes Every Night.      rosuvastatin (CRESTOR) 20 MG tablet Take 1 tablet by mouth Daily. 90 tablet 1     No current facility-administered medications on file prior to visit.       Objective   Pulse 68   Ht 182.9 cm (72\")   Wt 123 kg (272 lb)   SpO2 99%   BMI 36.89 kg/m²     Foot/Ankle Exam  Physical Exam  GENERAL  Orientation:  AAOx3  Affect:  appropriate     VASCULAR      Right Foot Vascularity   Normal vascular exam    Dorsalis pedis:  2+  Posterior tibial:  2+  Skin temperature:  warm  Edema grading:  None  CFT:  < 3 seconds  Pedal hair growth:  Present  Varicosities:  none      Left Foot Vascularity   Normal vascular exam    Dorsalis pedis:  2+  Posterior tibial:  2+  Skin temperature:  warm  Edema " grading:  None  CFT:  < 3 seconds  Pedal hair growth:  Present  Varicosities:  none     NEUROLOGIC      Right Foot Neurologic   Light touch sensation: normal  Hot/Cold sensation: normal  Achilles reflex:  2+      Left Foot Neurologic   Light touch sensation: normal  Hot/Cold sensation:  normal  Achilles reflex:  2+     MUSCULOSKELETAL      Right Foot Musculoskeletal   Arch:  Normal      Left Foot Musculoskeletal   Arch:  Normal     MUSCLE STRENGTH      Right Foot Muscle Strength   Normal strength    Foot dorsiflexion:  5  Foot plantar flexion:  5  Foot inversion:  5  Foot eversion:  5      Left Foot Muscle Strength   Normal strength    Foot dorsiflexion:  5  Foot plantar flexion:  5  Foot inversion:  5  Foot eversion:  5     DERMATOLOGIC       Right Foot Dermatologic   Skin  Right foot skin is intact.   Nails comment:  Nails 1-5      Left Foot Dermatologic   Skin  Left foot skin is intact.   Nails comment:  Nails 1-5     TESTS      Right Foot Tests   Anterior drawer: negative  Varus tilt: negative      Left Foot Tests   Anterior drawer: negative  Varus tilt: negative     Right foot additional comments: Moderate varicosities are observed in both lower extremities. Pes planus foot structure is observed on the right foot. No open wounds or signs of infection are observed on the right foot. Mallet toe deformity is present with rigidity involving the IPJ region of the right foot. Equinus contracture is present with the knee extended and flexed.     Left foot additional comments: An open, stable-appearing wound is present involving the plantar medial aspect of the left great toe at the interphalangeal joint region. The wound extends to subcutaneous tissue and measures approximately 1.5 x 0.5 cm.      6/18/24: Patient returns with wound improving to the plantar medial aspect of the left great toe.  Wound is granular.  No tunneling or tracking no signs of infection.  No maceration or extensive drainage.    1/15/25:  Continued wound involving the plantar medial aspect of the left foot.  The wound is consistent with a draining sinus at this time.  No significant purulence or other complicating features outside of valgus deformity involving the interphalangeal joint region and mild erythema.  Relatively stable findings involving the rest of the foot.      Results  Imaging  X-ray of left foot shows changes involving the joint where it has eroded out, indicating a high chance of bone infection.    Assessment & Plan   Diagnoses and all orders for this visit:    1. Chronic ulcer of great toe of left foot with necrosis of bone (Primary)  -     XR Foot 3+ View Left  -     MRI Foot Left Without Contrast; Future  -     Case Request; Standing  -     CBC (No Diff); Future  -     Basic Metabolic Panel; Future  -     XR Chest 2 View; Future  -     ECG 12 Lead; Future  -     ceFAZolin (ANCEF) 3,000 mg in sodium chloride 0.9 % 100 mL IVPB  -     Case Request    2. Acute osteomyelitis of left foot  -     Case Request; Standing  -     CBC (No Diff); Future  -     Basic Metabolic Panel; Future  -     XR Chest 2 View; Future  -     ECG 12 Lead; Future  -     ceFAZolin (ANCEF) 3,000 mg in sodium chloride 0.9 % 100 mL IVPB  -     Case Request    3. Acquired mallet toe, left    4. Other polyneuropathy    Other orders  -     Follow Anesthesia Guidelines / Protocol; Future  -     Follow Anesthesia Guidelines / Protocol; Standing  -     Verify / Perform Chlorhexidine Skin Prep; Standing  -     Provide NPO Instructions to Patient; Future  -     Chlorhexidine Skin Prep; Future  -     Place Sequential Compression Device; Standing  -     Maintain Sequential Compression Device; Standing      Assessment & Plan    Patient has been treated in the wound care center by the nurse practitioner for issues involving the left great toe.  I have seen the patient in June 2024 for the same issue.  Imaging was reviewed showing interval signs of progression of erosive  changes involving the interphalangeal joint region consistent with osteomyelitis.  Patient recently had redness, swelling, and drainage.  Wound culture was performed showing group B strep and and staph aureus growth.  Given the findings, I do feel that findings are highly compatible with osteomyelitis.  I have reviewed the diagnosis and treatment options with him.  I did recommend that we proceed with an MRI for confirmation of these findings.  We reviewed options of observation and IV antibiotics versus consideration for partial great toe amputation.  I do feel that the amputation is the more preferred route given the deformity to the toe as well as the infection.  Patient understands and in the best case situation he may still have recurrence of a callus or wound to the same area of the toe if it were to remain.  Patient understands and agrees and would like to proceed with the amputation.  We will obtain the MRI prior to surgery.  Procedure, risk, goals, and recovery were discussed in office.  He understands that he remains at risk of wound healing complications, residual infection, and more proximal amputation.  We will plan for the near future.  All questions were answered to patient's satisfaction.  Greater than 30 minutes was spent before, during, and after evaluation for patient care.             Patient or patient representative verbalized consent for the use of Ambient Listening during the visit with  REBECCA Baeza DPM for chart documentation. 1/16/2025  07:10 EST    REBECCA Baeza DPM

## 2025-01-16 NOTE — PROGRESS NOTES
"01/15/2025  Foot and Ankle Surgery - Established Patient/Follow-up  Provider: Dr. Jr Baeza DPM  Location: [facility]    Subjective:  Roger Lambert is a 74 y.o. male.     Chief Complaint   Patient presents with    Left Foot - Follow-up, Wound Check     Osteomyelitis - left 2nd toe - SX consult       History of Present Illness  The patient presents for evaluation of his left great toe.    He was last seen at the wound care center on Monday, where he reported an improvement in his condition following a course of antibiotics initiated in December. He experienced a similar level of drainage as before, accompanied by tightness and redness in the area. He has not undergone any MRI scans to date.      No Known Allergies    Current Outpatient Medications on File Prior to Visit   Medication Sig Dispense Refill    aspirin 81 MG EC tablet Take 1 tablet by mouth Daily. 30 tablet 1    clopidogrel (PLAVIX) 75 MG tablet Take 1 tablet by mouth Daily. 30 tablet 1    famotidine (PEPCID) 40 MG tablet Take 1 tablet by mouth Daily. 30 tablet 1    irbesartan-hydrochlorothiazide (AVALIDE) 150-12.5 MG tablet Take 0.5 tablets by mouth Daily.      latanoprost (XALATAN) 0.005 % ophthalmic solution Administer 1 drop to both eyes Every Night.      rosuvastatin (CRESTOR) 20 MG tablet Take 1 tablet by mouth Daily. 90 tablet 1     No current facility-administered medications on file prior to visit.       Objective   Pulse 68   Ht 182.9 cm (72\")   Wt 123 kg (272 lb)   SpO2 99%   BMI 36.89 kg/m²     Foot/Ankle Exam  Physical Exam  GENERAL  Orientation:  AAOx3  Affect:  appropriate     VASCULAR      Right Foot Vascularity   Normal vascular exam    Dorsalis pedis:  2+  Posterior tibial:  2+  Skin temperature:  warm  Edema grading:  None  CFT:  < 3 seconds  Pedal hair growth:  Present  Varicosities:  none      Left Foot Vascularity   Normal vascular exam    Dorsalis pedis:  2+  Posterior tibial:  2+  Skin temperature:  warm  Edema " grading:  None  CFT:  < 3 seconds  Pedal hair growth:  Present  Varicosities:  none     NEUROLOGIC      Right Foot Neurologic   Light touch sensation: normal  Hot/Cold sensation: normal  Achilles reflex:  2+      Left Foot Neurologic   Light touch sensation: normal  Hot/Cold sensation:  normal  Achilles reflex:  2+     MUSCULOSKELETAL      Right Foot Musculoskeletal   Arch:  Normal      Left Foot Musculoskeletal   Arch:  Normal     MUSCLE STRENGTH      Right Foot Muscle Strength   Normal strength    Foot dorsiflexion:  5  Foot plantar flexion:  5  Foot inversion:  5  Foot eversion:  5      Left Foot Muscle Strength   Normal strength    Foot dorsiflexion:  5  Foot plantar flexion:  5  Foot inversion:  5  Foot eversion:  5     DERMATOLOGIC       Right Foot Dermatologic   Skin  Right foot skin is intact.   Nails comment:  Nails 1-5      Left Foot Dermatologic   Skin  Left foot skin is intact.   Nails comment:  Nails 1-5     TESTS      Right Foot Tests   Anterior drawer: negative  Varus tilt: negative      Left Foot Tests   Anterior drawer: negative  Varus tilt: negative     Right foot additional comments: Moderate varicosities are observed in both lower extremities. Pes planus foot structure is observed on the right foot. No open wounds or signs of infection are observed on the right foot. Mallet toe deformity is present with rigidity involving the IPJ region of the right foot. Equinus contracture is present with the knee extended and flexed.     Left foot additional comments: An open, stable-appearing wound is present involving the plantar medial aspect of the left great toe at the interphalangeal joint region. The wound extends to subcutaneous tissue and measures approximately 1.5 x 0.5 cm.      6/18/24: Patient returns with wound improving to the plantar medial aspect of the left great toe.  Wound is granular.  No tunneling or tracking no signs of infection.  No maceration or extensive drainage.    1/15/25:  Continued wound involving the plantar medial aspect of the left foot.  The wound is consistent with a draining sinus at this time.  No significant purulence or other complicating features outside of valgus deformity involving the interphalangeal joint region and mild erythema.  Relatively stable findings involving the rest of the foot.      Results  Imaging  X-ray of left foot shows changes involving the joint where it has eroded out, indicating a high chance of bone infection.    Assessment & Plan   Diagnoses and all orders for this visit:    1. Chronic ulcer of great toe of left foot with necrosis of bone (Primary)  -     XR Foot 3+ View Left  -     MRI Foot Left Without Contrast; Future  -     Case Request; Standing  -     CBC (No Diff); Future  -     Basic Metabolic Panel; Future  -     XR Chest 2 View; Future  -     ECG 12 Lead; Future  -     ceFAZolin (ANCEF) 3,000 mg in sodium chloride 0.9 % 100 mL IVPB  -     Case Request    2. Acute osteomyelitis of left foot  -     Case Request; Standing  -     CBC (No Diff); Future  -     Basic Metabolic Panel; Future  -     XR Chest 2 View; Future  -     ECG 12 Lead; Future  -     ceFAZolin (ANCEF) 3,000 mg in sodium chloride 0.9 % 100 mL IVPB  -     Case Request    3. Acquired mallet toe, left    4. Other polyneuropathy    Other orders  -     Follow Anesthesia Guidelines / Protocol; Future  -     Follow Anesthesia Guidelines / Protocol; Standing  -     Verify / Perform Chlorhexidine Skin Prep; Standing  -     Provide NPO Instructions to Patient; Future  -     Chlorhexidine Skin Prep; Future  -     Place Sequential Compression Device; Standing  -     Maintain Sequential Compression Device; Standing      Assessment & Plan    Patient has been treated in the wound care center by the nurse practitioner for issues involving the left great toe.  I have seen the patient in June 2024 for the same issue.  Imaging was reviewed showing interval signs of progression of erosive  changes involving the interphalangeal joint region consistent with osteomyelitis.  Patient recently had redness, swelling, and drainage.  Wound culture was performed showing group B strep and and staph aureus growth.  Given the findings, I do feel that findings are highly compatible with osteomyelitis.  I have reviewed the diagnosis and treatment options with him.  I did recommend that we proceed with an MRI for confirmation of these findings.  We reviewed options of observation and IV antibiotics versus consideration for partial great toe amputation.  I do feel that the amputation is the more preferred route given the deformity to the toe as well as the infection.  Patient understands and in the best case situation he may still have recurrence of a callus or wound to the same area of the toe if it were to remain.  Patient understands and agrees and would like to proceed with the amputation.  We will obtain the MRI prior to surgery.  Procedure, risk, goals, and recovery were discussed in office.  He understands that he remains at risk of wound healing complications, residual infection, and more proximal amputation.  We will plan for the near future.  All questions were answered to patient's satisfaction.  Greater than 30 minutes was spent before, during, and after evaluation for patient care.             Patient or patient representative verbalized consent for the use of Ambient Listening during the visit with  REBECCA Baeza DPM for chart documentation. 1/16/2025  07:10 EST    REBECCA Baeza DPM

## 2025-01-27 ENCOUNTER — LAB (OUTPATIENT)
Dept: LAB | Facility: HOSPITAL | Age: 75
End: 2025-01-27
Payer: MEDICARE

## 2025-01-27 ENCOUNTER — HOSPITAL ENCOUNTER (OUTPATIENT)
Dept: GENERAL RADIOLOGY | Facility: HOSPITAL | Age: 75
Discharge: HOME | End: 2025-01-27
Payer: MEDICARE

## 2025-01-27 ENCOUNTER — HOSPITAL ENCOUNTER (OUTPATIENT)
Dept: CARDIOLOGY | Facility: HOSPITAL | Age: 75
Discharge: HOME | End: 2025-01-27
Payer: MEDICARE

## 2025-01-27 ENCOUNTER — OFFICE VISIT (OUTPATIENT)
Dept: WOUND CARE | Facility: HOSPITAL | Age: 75
End: 2025-01-27
Payer: MEDICARE

## 2025-01-27 DIAGNOSIS — M86.172 ACUTE OSTEOMYELITIS OF LEFT FOOT: ICD-10-CM

## 2025-01-27 DIAGNOSIS — L97.524 CHRONIC ULCER OF GREAT TOE OF LEFT FOOT WITH NECROSIS OF BONE: ICD-10-CM

## 2025-01-27 LAB
ANION GAP SERPL CALCULATED.3IONS-SCNC: 11.5 MMOL/L (ref 5–15)
BUN SERPL-MCNC: 13 MG/DL (ref 8–23)
BUN/CREAT SERPL: 11 (ref 7–25)
CALCIUM SPEC-SCNC: 9.9 MG/DL (ref 8.6–10.5)
CHLORIDE SERPL-SCNC: 103 MMOL/L (ref 98–107)
CO2 SERPL-SCNC: 26.5 MMOL/L (ref 22–29)
CREAT SERPL-MCNC: 1.18 MG/DL (ref 0.76–1.27)
DEPRECATED RDW RBC AUTO: 43.8 FL (ref 37–54)
EGFRCR SERPLBLD CKD-EPI 2021: 64 ML/MIN/1.73
ERYTHROCYTE [DISTWIDTH] IN BLOOD BY AUTOMATED COUNT: 14.1 % (ref 12.3–15.4)
GLUCOSE SERPL-MCNC: 110 MG/DL (ref 65–99)
HCT VFR BLD AUTO: 42.3 % (ref 37.5–51)
HGB BLD-MCNC: 14.2 G/DL (ref 13–17.7)
MCH RBC QN AUTO: 29 PG (ref 26.6–33)
MCHC RBC AUTO-ENTMCNC: 33.6 G/DL (ref 31.5–35.7)
MCV RBC AUTO: 86.3 FL (ref 79–97)
PLATELET # BLD AUTO: 267 10*3/MM3 (ref 140–450)
PMV BLD AUTO: 9.8 FL (ref 6–12)
POTASSIUM SERPL-SCNC: 3.9 MMOL/L (ref 3.5–5.2)
QT INTERVAL: 388 MS
QTC INTERVAL: 416 MS
RBC # BLD AUTO: 4.9 10*6/MM3 (ref 4.14–5.8)
SODIUM SERPL-SCNC: 141 MMOL/L (ref 136–145)
WBC NRBC COR # BLD AUTO: 5.99 10*3/MM3 (ref 3.4–10.8)

## 2025-01-27 PROCEDURE — 85027 COMPLETE CBC AUTOMATED: CPT

## 2025-01-27 PROCEDURE — 36415 COLL VENOUS BLD VENIPUNCTURE: CPT

## 2025-01-27 PROCEDURE — 93005 ELECTROCARDIOGRAM TRACING: CPT | Performed by: PODIATRIST

## 2025-01-27 PROCEDURE — 71046 X-RAY EXAM CHEST 2 VIEWS: CPT

## 2025-01-27 PROCEDURE — 80048 BASIC METABOLIC PNL TOTAL CA: CPT

## 2025-02-07 ENCOUNTER — HOSPITAL ENCOUNTER (OUTPATIENT)
Facility: HOSPITAL | Age: 75
Setting detail: HOSPITAL OUTPATIENT SURGERY
Discharge: HOME | End: 2025-02-07
Attending: PODIATRIST | Admitting: PODIATRIST
Payer: MEDICARE

## 2025-02-07 ENCOUNTER — ANESTHESIA EVENT (OUTPATIENT)
Dept: PERIOP | Facility: HOSPITAL | Age: 75
End: 2025-02-07
Payer: MEDICARE

## 2025-02-07 ENCOUNTER — ANESTHESIA (OUTPATIENT)
Dept: PERIOP | Facility: HOSPITAL | Age: 75
End: 2025-02-07
Payer: MEDICARE

## 2025-02-07 VITALS
WEIGHT: 262.8 LBS | HEART RATE: 51 BPM | SYSTOLIC BLOOD PRESSURE: 148 MMHG | BODY MASS INDEX: 37.62 KG/M2 | HEIGHT: 70 IN | DIASTOLIC BLOOD PRESSURE: 70 MMHG | OXYGEN SATURATION: 96 % | RESPIRATION RATE: 12 BRPM | TEMPERATURE: 97.7 F

## 2025-02-07 DIAGNOSIS — M86.172 ACUTE OSTEOMYELITIS OF LEFT FOOT: ICD-10-CM

## 2025-02-07 DIAGNOSIS — L97.524 CHRONIC ULCER OF GREAT TOE OF LEFT FOOT WITH NECROSIS OF BONE: ICD-10-CM

## 2025-02-07 LAB — GLUCOSE BLDC GLUCOMTR-MCNC: 99 MG/DL (ref 70–105)

## 2025-02-07 PROCEDURE — 25810000003 SODIUM CHLORIDE 0.9 % SOLUTION

## 2025-02-07 PROCEDURE — 87075 CULTR BACTERIA EXCEPT BLOOD: CPT | Performed by: PODIATRIST

## 2025-02-07 PROCEDURE — 87076 CULTURE ANAEROBE IDENT EACH: CPT | Performed by: PODIATRIST

## 2025-02-07 PROCEDURE — 25010000002 CEFAZOLIN 3 G RECONSTITUTED SOLUTION 1 EACH VIAL: Performed by: PODIATRIST

## 2025-02-07 PROCEDURE — 25010000002 PROPOFOL 10 MG/ML EMULSION

## 2025-02-07 PROCEDURE — 87070 CULTURE OTHR SPECIMN AEROBIC: CPT | Performed by: PODIATRIST

## 2025-02-07 PROCEDURE — 25010000002 LIDOCAINE PF 1% 1 % SOLUTION

## 2025-02-07 PROCEDURE — 87205 SMEAR GRAM STAIN: CPT | Performed by: PODIATRIST

## 2025-02-07 PROCEDURE — 87176 TISSUE HOMOGENIZATION CULTR: CPT | Performed by: PODIATRIST

## 2025-02-07 PROCEDURE — 88305 TISSUE EXAM BY PATHOLOGIST: CPT | Performed by: PODIATRIST

## 2025-02-07 PROCEDURE — 25010000002 ONDANSETRON PER 1 MG

## 2025-02-07 PROCEDURE — 28825 PARTIAL AMPUTATION OF TOE: CPT | Performed by: PODIATRIST

## 2025-02-07 PROCEDURE — 82948 REAGENT STRIP/BLOOD GLUCOSE: CPT

## 2025-02-07 PROCEDURE — 25810000003 LACTATED RINGERS PER 1000 ML: Performed by: PODIATRIST

## 2025-02-07 PROCEDURE — 25010000002 DEXAMETHASONE PER 1 MG

## 2025-02-07 PROCEDURE — 25010000002 BUPIVACAINE 0.5 % SOLUTION: Performed by: PODIATRIST

## 2025-02-07 RX ORDER — ONDANSETRON 2 MG/ML
4 INJECTION INTRAMUSCULAR; INTRAVENOUS ONCE AS NEEDED
Status: DISCONTINUED | OUTPATIENT
Start: 2025-02-07 | End: 2025-02-07 | Stop reason: HOSPADM

## 2025-02-07 RX ORDER — ONDANSETRON 2 MG/ML
INJECTION INTRAMUSCULAR; INTRAVENOUS AS NEEDED
Status: DISCONTINUED | OUTPATIENT
Start: 2025-02-07 | End: 2025-02-07 | Stop reason: SURG

## 2025-02-07 RX ORDER — EPHEDRINE SULFATE 5 MG/ML
5 INJECTION INTRAVENOUS ONCE AS NEEDED
Status: DISCONTINUED | OUTPATIENT
Start: 2025-02-07 | End: 2025-02-07 | Stop reason: HOSPADM

## 2025-02-07 RX ORDER — LABETALOL HYDROCHLORIDE 5 MG/ML
5 INJECTION, SOLUTION INTRAVENOUS
Status: DISCONTINUED | OUTPATIENT
Start: 2025-02-07 | End: 2025-02-07 | Stop reason: HOSPADM

## 2025-02-07 RX ORDER — SODIUM CHLORIDE, SODIUM LACTATE, POTASSIUM CHLORIDE, CALCIUM CHLORIDE 600; 310; 30; 20 MG/100ML; MG/100ML; MG/100ML; MG/100ML
50 INJECTION, SOLUTION INTRAVENOUS CONTINUOUS
Status: DISCONTINUED | OUTPATIENT
Start: 2025-02-07 | End: 2025-02-07 | Stop reason: HOSPADM

## 2025-02-07 RX ORDER — IPRATROPIUM BROMIDE AND ALBUTEROL SULFATE 2.5; .5 MG/3ML; MG/3ML
3 SOLUTION RESPIRATORY (INHALATION) ONCE AS NEEDED
Status: DISCONTINUED | OUTPATIENT
Start: 2025-02-07 | End: 2025-02-07 | Stop reason: HOSPADM

## 2025-02-07 RX ORDER — HYDRALAZINE HYDROCHLORIDE 20 MG/ML
5 INJECTION INTRAMUSCULAR; INTRAVENOUS
Status: DISCONTINUED | OUTPATIENT
Start: 2025-02-07 | End: 2025-02-07 | Stop reason: HOSPADM

## 2025-02-07 RX ORDER — HYDROCODONE BITARTRATE AND ACETAMINOPHEN 7.5; 325 MG/1; MG/1
1 TABLET ORAL EVERY 6 HOURS PRN
Qty: 28 TABLET | Refills: 0 | Status: SHIPPED | OUTPATIENT
Start: 2025-02-07

## 2025-02-07 RX ORDER — FENTANYL CITRATE 50 UG/ML
50 INJECTION, SOLUTION INTRAMUSCULAR; INTRAVENOUS
Status: DISCONTINUED | OUTPATIENT
Start: 2025-02-07 | End: 2025-02-07 | Stop reason: HOSPADM

## 2025-02-07 RX ORDER — OXYCODONE HYDROCHLORIDE 5 MG/1
10 TABLET ORAL EVERY 4 HOURS PRN
Status: DISCONTINUED | OUTPATIENT
Start: 2025-02-07 | End: 2025-02-07 | Stop reason: HOSPADM

## 2025-02-07 RX ORDER — DEXAMETHASONE SODIUM PHOSPHATE 4 MG/ML
INJECTION, SOLUTION INTRA-ARTICULAR; INTRALESIONAL; INTRAMUSCULAR; INTRAVENOUS; SOFT TISSUE AS NEEDED
Status: DISCONTINUED | OUTPATIENT
Start: 2025-02-07 | End: 2025-02-07 | Stop reason: SURG

## 2025-02-07 RX ORDER — LIDOCAINE HYDROCHLORIDE 10 MG/ML
0.5 INJECTION, SOLUTION EPIDURAL; INFILTRATION; INTRACAUDAL; PERINEURAL ONCE AS NEEDED
Status: DISCONTINUED | OUTPATIENT
Start: 2025-02-07 | End: 2025-02-07 | Stop reason: HOSPADM

## 2025-02-07 RX ORDER — OXYCODONE HYDROCHLORIDE 5 MG/1
5 TABLET ORAL ONCE AS NEEDED
Status: COMPLETED | OUTPATIENT
Start: 2025-02-07 | End: 2025-02-07

## 2025-02-07 RX ORDER — PROPOFOL 10 MG/ML
VIAL (ML) INTRAVENOUS AS NEEDED
Status: DISCONTINUED | OUTPATIENT
Start: 2025-02-07 | End: 2025-02-07 | Stop reason: SURG

## 2025-02-07 RX ORDER — BUPIVACAINE HYDROCHLORIDE 5 MG/ML
INJECTION, SOLUTION PERINEURAL AS NEEDED
Status: DISCONTINUED | OUTPATIENT
Start: 2025-02-07 | End: 2025-02-07 | Stop reason: HOSPADM

## 2025-02-07 RX ORDER — SODIUM CHLORIDE 0.9 % (FLUSH) 0.9 %
10 SYRINGE (ML) INJECTION AS NEEDED
Status: DISCONTINUED | OUTPATIENT
Start: 2025-02-07 | End: 2025-02-07 | Stop reason: HOSPADM

## 2025-02-07 RX ORDER — NALOXONE HCL 0.4 MG/ML
0.4 VIAL (ML) INJECTION AS NEEDED
Status: DISCONTINUED | OUTPATIENT
Start: 2025-02-07 | End: 2025-02-07 | Stop reason: HOSPADM

## 2025-02-07 RX ORDER — SODIUM CHLORIDE 9 MG/ML
INJECTION, SOLUTION INTRAVENOUS CONTINUOUS PRN
Status: DISCONTINUED | OUTPATIENT
Start: 2025-02-07 | End: 2025-02-07 | Stop reason: SURG

## 2025-02-07 RX ORDER — DIPHENHYDRAMINE HYDROCHLORIDE 50 MG/ML
12.5 INJECTION INTRAMUSCULAR; INTRAVENOUS
Status: DISCONTINUED | OUTPATIENT
Start: 2025-02-07 | End: 2025-02-07 | Stop reason: HOSPADM

## 2025-02-07 RX ORDER — DIPHENHYDRAMINE HYDROCHLORIDE 50 MG/ML
12.5 INJECTION INTRAMUSCULAR; INTRAVENOUS ONCE AS NEEDED
Status: DISCONTINUED | OUTPATIENT
Start: 2025-02-07 | End: 2025-02-07 | Stop reason: HOSPADM

## 2025-02-07 RX ORDER — FLUMAZENIL 0.1 MG/ML
0.2 INJECTION INTRAVENOUS AS NEEDED
Status: DISCONTINUED | OUTPATIENT
Start: 2025-02-07 | End: 2025-02-07 | Stop reason: HOSPADM

## 2025-02-07 RX ORDER — LIDOCAINE HYDROCHLORIDE 10 MG/ML
INJECTION, SOLUTION EPIDURAL; INFILTRATION; INTRACAUDAL; PERINEURAL AS NEEDED
Status: DISCONTINUED | OUTPATIENT
Start: 2025-02-07 | End: 2025-02-07 | Stop reason: SURG

## 2025-02-07 RX ADMIN — ONDANSETRON 4 MG: 2 INJECTION INTRAMUSCULAR; INTRAVENOUS at 10:20

## 2025-02-07 RX ADMIN — LIDOCAINE HYDROCHLORIDE 50 MG: 10 INJECTION, SOLUTION EPIDURAL; INFILTRATION; INTRACAUDAL; PERINEURAL at 10:12

## 2025-02-07 RX ADMIN — CEFAZOLIN 3000 MG: 3 INJECTION, POWDER, FOR SOLUTION INTRAVENOUS at 10:00

## 2025-02-07 RX ADMIN — SODIUM CHLORIDE: 9 INJECTION, SOLUTION INTRAVENOUS at 10:07

## 2025-02-07 RX ADMIN — PROPOFOL 200 MG: 10 INJECTION, EMULSION INTRAVENOUS at 10:12

## 2025-02-07 RX ADMIN — DEXAMETHASONE SODIUM PHOSPHATE 4 MG: 4 INJECTION, SOLUTION INTRAMUSCULAR; INTRAVENOUS at 10:20

## 2025-02-07 RX ADMIN — SODIUM CHLORIDE, POTASSIUM CHLORIDE, SODIUM LACTATE AND CALCIUM CHLORIDE 50 ML/HR: 600; 310; 30; 20 INJECTION, SOLUTION INTRAVENOUS at 08:07

## 2025-02-07 RX ADMIN — OXYCODONE HYDROCHLORIDE 5 MG: 5 TABLET ORAL at 10:53

## 2025-02-07 NOTE — ANESTHESIA PREPROCEDURE EVALUATION
" Anesthesia Evaluation     Patient summary reviewed and Nursing notes reviewed   NPO Solid Status: > 8 hours  NPO Liquid Status: > 8 hours           Airway   Mallampati: II  TM distance: >3 FB  Neck ROM: full  No difficulty expected  Dental - normal exam   (+) poor dentition    Pulmonary - negative pulmonary ROS and normal exam   Cardiovascular - normal exam    (+) hypertension well controlled      Neuro/Psych  (+) TIA  GI/Hepatic/Renal/Endo    (+) diabetes mellitus (\"borderline\") type 2    Musculoskeletal (-) negative ROS    Abdominal  - normal exam    Bowel sounds: normal.   Substance History - negative use     OB/GYN negative ob/gyn ROS         Other                      Anesthesia Plan    ASA 3     general     intravenous induction     Anesthetic plan, risks, benefits, and alternatives have been provided, discussed and informed consent has been obtained with: patient.  Pre-procedure education provided  Plan discussed with CRNA.      CODE STATUS:         "

## 2025-02-07 NOTE — ANESTHESIA PROCEDURE NOTES
Airway  Urgency: elective    Date/Time: 2/7/2025 10:12 AM  Airway not difficult    General Information and Staff    Patient location during procedure: OR  Anesthesiologist: Huan Thacker MD  CRNA/CAA: Jacquelyn Roque CRNA    Indications and Patient Condition  Indications for airway management: airway protection    Preoxygenated: yes  MILS maintained throughout  Mask difficulty assessment: 1 - vent by mask    Final Airway Details  Final airway type: supraglottic airway      Successful airway: I-gel  Size 4     Number of attempts at approach: 1  Assessment: lips, teeth, and gum same as pre-op and atraumatic intubation

## 2025-02-07 NOTE — ANESTHESIA POSTPROCEDURE EVALUATION
Patient: Roger Lambert    Procedure Summary       Date: 02/07/25 Room / Location: [facility] OR 06 / [facility] MAIN OR    Anesthesia Start: 0958 Anesthesia Stop: 1034    Procedure: Partial left great toe amputation (Left: Foot) Diagnosis:       Chronic ulcer of great toe of left foot with necrosis of bone      Acute osteomyelitis of left foot      (Chronic ulcer of great toe of left foot with necrosis of bone [L97.524])      (Acute osteomyelitis of left foot [M86.172])    Surgeons: REBECCA Baeza DPM Provider: Huan Thacker MD    Anesthesia Type: general ASA Status: 3            Anesthesia Type: general    Vitals  Vitals Value Taken Time   /59 02/07/25 1049   Temp 97.5 °F (36.4 °C) 02/07/25 1038   Pulse 56 02/07/25 1050   Resp 15 02/07/25 1047   SpO2 95 % 02/07/25 1050   Vitals shown include unfiled device data.        Post Anesthesia Care and Evaluation    Patient location during evaluation: PACU  Patient participation: complete - patient participated  Level of consciousness: awake  Pain scale: See nurse's notes for pain score.  Pain management: adequate    Airway patency: patent  Anesthetic complications: No anesthetic complications  PONV Status: none  Cardiovascular status: acceptable  Respiratory status: acceptable and spontaneous ventilation  Hydration status: acceptable    Comments: Patient seen and examined postoperatively; vital signs stable; SpO2 greater than or equal to 90%; cardiopulmonary status stable; nausea/vomiting adequately controlled; pain adequately controlled; no apparent anesthesia complications; patient discharged from anesthesia care when discharge criteria were met

## 2025-02-07 NOTE — OP NOTE
Operative Note   Foot and Ankle Surgery   Provider: Dr. Jr Baeza   Location: [facility]      Procedure:  1.  Partial left great toe amputation    Pre-operative Diagnosis:   1.  Chronic ulcer with bone necrosis, left great toe  2.  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  Other polyneuropathy    Post-operative Diagnosis: Same    Surgeon: Jr Baeza    Assistant: Randy Laughlin PGY3    Anesthesia: General    Implants: None    Findings: No unexpected findings    Specimen: Bone from the marginal proximal phalanx sent to microbiology for aerobic and anaerobic culture and sensitivity.  Distal digit sent to pathology as permanent    Blood Loss: Less than 5cc    Complications: None    Post Op Plan: Discharge home.  Weightbearing activity as needed in postop shoe.  Follow-up with the in 2 weeks    Summary:    Patient is a 74-year-old male with neuropathy involving his feet that has been seen in office for chronic wound involving the plantar medial aspect of the left great toe.  Patient has been dealing with the wound for several months.  He was being seen in the wound care center and then referred to me for evaluation.  Patient had recent infection and obvious osseous changes involving the interphalangeal joint of the great toe.  All findings were indicative of osteomyelitis.  MRI was also performed which shows erosive changes involving the interphalangeal joint highly compatible with osteomyelitis.  Given his history and the duration of the wound, we did discuss options of partial great toe amputation.  I did review the possibility of continued observation with wound local wound care given the relative stability of the toe.  Patient states that he does not want to consider and would like to proceed with partial toe amputation.    Procedure, risks, complications, and goals were discussed with the patient at bedside.  Risks include but are not limited to infection, complications  from anesthesia (including death), chronic pain or numbness, hematoma/seroma, deep vein thrombosis, wound complications, and potential for additional surgical procedures.  Patient understands and elects to proceed with surgery at this time. Informed consent was obtained before proceeding to the operating suite.  All questions were answered to the patient's satisfaction. No guarantees or assurances were given or implied.    Procedure:     Patient was brought to the operating room and placed on the operative table in the supine position.  Once adequate general anesthesia was administered, a pneumatic tourniquet was placed about the patient's left ankle.  The lower extremity was scrubbed prepped and draped in usual sterile fashion.  A formal timeout was conducted prior skin incision.  The limb was elevated and exsanguinated and the pneumatic tourniquet was inflated to 250 mmHg.     Attention was then directed to the great toe.  A fishmouth type incision was performed at the level of the proximal phalanx.  Full-thickness incision was performed towards the interphalangeal joint region. A sagittal saw was then used to perform a dorsal distal to plantar proximal osteotomy to the mid diaphyseal region of the proximal phalanx.  The distal portion of the proximal phalanx was removed and sent with the distal aspect of the digit to pathology as permanent.  A small amount of bone from the marginal proximal phalanx was removed with a curette and sent to microbiology for aerobic and anaerobic culture and sensitivity. No abnormal features were noted at the amputation site.  Rough edges were smoothed.  The wound was irrigated with copious amounts of normal saline.  The extensor and flexor tendons were retracted distally and cut proximally.  Final irrigation was performed and the deep structures were closed with a 3-0 Vicryl in a simple interrupted manner.  The skin was repaired with a 3-0 nylon in a simple interrupted manner.  The  incision was dressed with Xeroform and sterile compressive dressings.  The tourniquet was released and a prompt hyperemic response was noted to remaining digits of the left foot.  Patient tolerated the procedure and anesthesia well.  He was transferred from the operating room to the recovery room with vital signs stable and neurovascular status unchanged to the left lower extremity.      Dr. Jr Baeza, VIVIAN  [facility]  [phone]    Note is dictated utilizing voice recognition software. Unfortunately this leads to occasional typographical errors. I apologize in advance if the situation occurs. If questions occur please do not hesitate to call our office.

## 2025-02-10 LAB
BACTERIA SPEC AEROBE CULT: NORMAL
GRAM STN SPEC: NORMAL
GRAM STN SPEC: NORMAL
LAB AP CASE REPORT: NORMAL
PATH REPORT.FINAL DX SPEC: NORMAL
PATH REPORT.GROSS SPEC: NORMAL

## 2025-02-16 LAB — BACTERIA SPEC ANAEROBE CULT: ABNORMAL

## 2025-02-20 ENCOUNTER — OFFICE VISIT (OUTPATIENT)
Age: 75
End: 2025-02-20
Payer: MEDICARE

## 2025-02-20 VITALS — BODY MASS INDEX: 37.51 KG/M2 | HEIGHT: 70 IN | HEART RATE: 92 BPM | WEIGHT: 262 LBS | OXYGEN SATURATION: 100 %

## 2025-02-20 DIAGNOSIS — L97.524 CHRONIC ULCER OF GREAT TOE OF LEFT FOOT WITH NECROSIS OF BONE: Primary | ICD-10-CM

## 2025-02-20 DIAGNOSIS — M20.5X2 ACQUIRED MALLET TOE, LEFT: ICD-10-CM

## 2025-02-20 DIAGNOSIS — G62.89 OTHER POLYNEUROPATHY: ICD-10-CM

## 2025-02-20 DIAGNOSIS — M20.42 HAMMER TOE OF LEFT FOOT: ICD-10-CM

## 2025-02-20 DIAGNOSIS — M86.172 ACUTE OSTEOMYELITIS OF LEFT FOOT: ICD-10-CM

## 2025-02-20 NOTE — PROGRESS NOTES
"02/20/2025  Foot and Ankle Surgery - Established Patient/Follow-up  Provider: Dr. Jr Baeza DPM  Location: [facility]    Subjective:  Roger Lambert is a 74 y.o. male.     Chief Complaint   Patient presents with    Left Foot - Post-op     1. Partial left great toe amputation    Post-op     PCP: Abel Perez MD  Last PCP Visit: 11/12/24       History of Present Illness  The patient presents for postoperative follow-up.    He underwent a surgical procedure on his foot approximately 2 weeks ago. The accompanying adult female inquires about the possibility of him transitioning to regular footwear today.      No Known Allergies    Current Outpatient Medications on File Prior to Visit   Medication Sig Dispense Refill    aspirin 81 MG EC tablet Take 1 tablet by mouth Daily. 30 tablet 1    clopidogrel (PLAVIX) 75 MG tablet Take 1 tablet by mouth Daily. 30 tablet 1    famotidine (PEPCID) 40 MG tablet Take 1 tablet by mouth Daily. 30 tablet 1    HYDROcodone-acetaminophen (NORCO) 7.5-325 MG per tablet Take 1 tablet by mouth Every 6 (Six) Hours As Needed for Moderate Pain (Pain). 28 tablet 0    irbesartan-hydrochlorothiazide (AVALIDE) 150-12.5 MG tablet Take 0.5 tablets by mouth Daily.      latanoprost (XALATAN) 0.005 % ophthalmic solution Administer 1 drop to both eyes Every Night.      Nutritional Supplements (OSTEO ADVANCE PO) Take  by mouth.      rosuvastatin (CRESTOR) 20 MG tablet Take 1 tablet by mouth Daily. 90 tablet 1     No current facility-administered medications on file prior to visit.       Objective   Pulse 92   Ht 177.8 cm (70\")   Wt 119 kg (262 lb)   SpO2 100%   BMI 37.59 kg/m²     Foot/Ankle Exam  Physical Exam  GENERAL  Orientation:  AAOx3  Affect:  appropriate     VASCULAR      Right Foot Vascularity   Normal vascular exam    Dorsalis pedis:  2+  Posterior tibial:  2+  Skin temperature:  warm  Edema grading:  None  CFT:  < 3 seconds  Pedal hair growth:  Present  Varicosities:  none   "    Left Foot Vascularity   Normal vascular exam    Dorsalis pedis:  2+  Posterior tibial:  2+  Skin temperature:  warm  Edema grading:  None  CFT:  < 3 seconds  Pedal hair growth:  Present  Varicosities:  none     NEUROLOGIC      Right Foot Neurologic   Light touch sensation: normal  Hot/Cold sensation: normal  Achilles reflex:  2+      Left Foot Neurologic   Light touch sensation: normal  Hot/Cold sensation:  normal  Achilles reflex:  2+     MUSCULOSKELETAL      Right Foot Musculoskeletal   Arch:  Normal      Left Foot Musculoskeletal   Arch:  Normal     MUSCLE STRENGTH      Right Foot Muscle Strength   Normal strength    Foot dorsiflexion:  5  Foot plantar flexion:  5  Foot inversion:  5  Foot eversion:  5      Left Foot Muscle Strength   Normal strength    Foot dorsiflexion:  5  Foot plantar flexion:  5  Foot inversion:  5  Foot eversion:  5     DERMATOLOGIC       Right Foot Dermatologic   Skin  Right foot skin is intact.   Nails comment:  Nails 1-5      Left Foot Dermatologic   Skin  Left foot skin is intact.   Nails comment:  Nails 1-5     TESTS      Right Foot Tests   Anterior drawer: negative  Varus tilt: negative      Left Foot Tests   Anterior drawer: negative  Varus tilt: negative     Right foot additional comments: Moderate varicosities are observed in both lower extremities. Pes planus foot structure is observed on the right foot. No open wounds or signs of infection are observed on the right foot. Mallet toe deformity is present with rigidity involving the IPJ region of the right foot. Equinus contracture is present with the knee extended and flexed.     Left foot additional comments: An open, stable-appearing wound is present involving the plantar medial aspect of the left great toe at the interphalangeal joint region. The wound extends to subcutaneous tissue and measures approximately 1.5 x 0.5 cm.      6/18/24: Patient returns with wound improving to the plantar medial aspect of the left great toe.   Wound is granular.  No tunneling or tracking no signs of infection.  No maceration or extensive drainage.     1/15/25: Continued wound involving the plantar medial aspect of the left foot.  The wound is consistent with a draining sinus at this time.  No significant purulence or other complicating features outside of valgus deformity involving the interphalangeal joint region and mild erythema.  Relatively stable findings involving the rest of the foot.    2/20/25: Amputation site is dry and stable with intact staples.  No evidence of dehiscence or infection.  Mild to moderate contracture involving the lesser digits.  No open wounds to this areas.      Results      Assessment & Plan   Diagnoses and all orders for this visit:    1. 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 Acute osteomyelitis of left foot    3. Hammer toe of left foot    4. Acquired mallet toe, left    5. Other polyneuropathy      Assessment & Plan    The surgical site has demonstrated satisfactory healing progress. He is advised to monitor the second, third, fourth, and fifth toes for any signs of callus formation or other complications. The use of a crest pad is recommended to maintain toe alignment and prevent potential issues. He is encouraged to gradually transition into regular footwear and progressively increase his activity level. Hygiene practices such as showering, bathing, and moisturizing the feet should be maintained. A diabetic foot cream or moisturizer is suggested for optimal foot care. The staples will be removed during this visit. If significant calluses develop, an in-office procedure to release the tendons may be considered.Reviewed proper basic stretching and manual therapy exercises along with appropriate shoes and activity.  Discussed proper use and/or avoidance of OTC anti-inflammatories.  Patient is to call with any additional issues or concerns.  Greater than 20 minutes was spent before,  during, and after evaluation for patient care.    Follow-up  The patient will follow up in 2 months.               Patient or patient representative verbalized consent for the use of Ambient Listening during the visit with  REBECCA Baeza DPM for chart documentation. 2/20/2025  09:58 EST    REBECCA Baeza DPM

## 2025-04-22 ENCOUNTER — OFFICE VISIT (OUTPATIENT)
Age: 75
End: 2025-04-22
Payer: MEDICARE

## 2025-04-22 VITALS — OXYGEN SATURATION: 97 % | HEIGHT: 70 IN | WEIGHT: 262 LBS | BODY MASS INDEX: 37.51 KG/M2 | HEART RATE: 78 BPM

## 2025-04-22 DIAGNOSIS — M20.42 HAMMER TOE OF LEFT FOOT: ICD-10-CM

## 2025-04-22 DIAGNOSIS — I87.303 CHRONIC VENOUS HYPERTENSION WITHOUT COMPLICATIONS, BILATERAL: ICD-10-CM

## 2025-04-22 DIAGNOSIS — R26.81 UNSTEADINESS ON FEET: ICD-10-CM

## 2025-04-22 DIAGNOSIS — Z89.422 LEFT TOE AMPUTEE: Primary | ICD-10-CM

## 2025-04-22 DIAGNOSIS — G62.89 OTHER POLYNEUROPATHY: ICD-10-CM

## 2025-04-23 NOTE — PROGRESS NOTES
"04/22/2025  Foot and Ankle Surgery - Established Patient/Follow-up  Provider: Dr. Jr Baeza DPM  Location: [facility]    Subjective:  Roger Lambert is a 74 y.o. male.     Chief Complaint   Patient presents with    Left Foot - Post-op     2/7/25 ARTUR  Partial left great toe amputation    Post-op     PCP: Abel Perez MD  Last PCP Visit:   11/12/24       History of Present Illness  The patient presents for evaluation of his amputation site.    He reports a satisfactory recovery following the removal of a staple from his toe. He has discontinued the use of a ring-like device around his toe due to the development of a bruise. He has been applying an exfoliating cream and Gold Bond to the affected area.      No Known Allergies    Current Outpatient Medications on File Prior to Visit   Medication Sig Dispense Refill    aspirin 81 MG EC tablet Take 1 tablet by mouth Daily. 30 tablet 1    clopidogrel (PLAVIX) 75 MG tablet Take 1 tablet by mouth Daily. 30 tablet 1    famotidine (PEPCID) 40 MG tablet Take 1 tablet by mouth Daily. 30 tablet 1    HYDROcodone-acetaminophen (NORCO) 7.5-325 MG per tablet Take 1 tablet by mouth Every 6 (Six) Hours As Needed for Moderate Pain (Pain). 28 tablet 0    irbesartan-hydrochlorothiazide (AVALIDE) 150-12.5 MG tablet Take 0.5 tablets by mouth Daily.      latanoprost (XALATAN) 0.005 % ophthalmic solution Administer 1 drop to both eyes Every Night.      Nutritional Supplements (OSTEO ADVANCE PO) Take  by mouth.      rosuvastatin (CRESTOR) 20 MG tablet Take 1 tablet by mouth Daily. 90 tablet 1     No current facility-administered medications on file prior to visit.       Objective   Pulse 78   Ht 177.8 cm (70\")   Wt 119 kg (262 lb)   SpO2 97%   BMI 37.59 kg/m²     Foot/Ankle Exam  Physical Exam  GENERAL  Orientation:  AAOx3  Affect:  appropriate     VASCULAR      Right Foot Vascularity   Normal vascular exam    Dorsalis pedis:  2+  Posterior tibial:  2+  Skin " temperature:  warm  Edema grading:  None  CFT:  < 3 seconds  Pedal hair growth:  Present  Varicosities:  none      Left Foot Vascularity   Normal vascular exam    Dorsalis pedis:  2+  Posterior tibial:  2+  Skin temperature:  warm  Edema grading:  None  CFT:  < 3 seconds  Pedal hair growth:  Present  Varicosities:  none     NEUROLOGIC      Right Foot Neurologic   Light touch sensation: normal  Hot/Cold sensation: normal  Achilles reflex:  2+      Left Foot Neurologic   Light touch sensation: normal  Hot/Cold sensation:  normal  Achilles reflex:  2+     MUSCULOSKELETAL      Right Foot Musculoskeletal   Arch:  Normal      Left Foot Musculoskeletal   Arch:  Normal     MUSCLE STRENGTH      Right Foot Muscle Strength   Normal strength    Foot dorsiflexion:  5  Foot plantar flexion:  5  Foot inversion:  5  Foot eversion:  5      Left Foot Muscle Strength   Normal strength    Foot dorsiflexion:  5  Foot plantar flexion:  5  Foot inversion:  5  Foot eversion:  5     DERMATOLOGIC       Right Foot Dermatologic   Skin  Right foot skin is intact.   Nails comment:  Nails 1-5      Left Foot Dermatologic   Skin  Left foot skin is intact.   Nails comment:  Nails 1-5     TESTS      Right Foot Tests   Anterior drawer: negative  Varus tilt: negative      Left Foot Tests   Anterior drawer: negative  Varus tilt: negative     Right foot additional comments: Moderate varicosities are observed in both lower extremities. Pes planus foot structure is observed on the right foot. No open wounds or signs of infection are observed on the right foot. Mallet toe deformity is present with rigidity involving the IPJ region of the right foot. Equinus contracture is present with the knee extended and flexed.     Left foot additional comments: An open, stable-appearing wound is present involving the plantar medial aspect of the left great toe at the interphalangeal joint region. The wound extends to subcutaneous tissue and measures approximately 1.5 x  0.5 cm.      6/18/24: Patient returns with wound improving to the plantar medial aspect of the left great toe.  Wound is granular.  No tunneling or tracking no signs of infection.  No maceration or extensive drainage.     1/15/25: Continued wound involving the plantar medial aspect of the left foot.  The wound is consistent with a draining sinus at this time.  No significant purulence or other complicating features outside of valgus deformity involving the interphalangeal joint region and mild erythema.  Relatively stable findings involving the rest of the foot.     2/20/25: Amputation site is dry and stable with intact staples.  No evidence of dehiscence or infection.  Mild to moderate contracture involving the lesser digits.  No open wounds to this areas.    4/22/25: Amputation site is well-healed.  No progressive deformity or instability involving the feet.  Lesser digits are stable.  No underlying hyperkeratotic lesion or open wounds.  Continued mild swelling involving the left lower extremity consistent with chronic venous hypertension.        Results      Assessment & Plan   Diagnoses and all orders for this visit:    1. Left toe amputee (Primary)    2. Hammer toe of left foot    3. Other polyneuropathy    4. Unsteadiness on feet    5. Chronic venous hypertension without complications, bilateral      Assessment & Plan  The amputation site appears to be healing well, with no significant changes observed in the toes. The presence of hammertoes is not a cause for concern at this stage. He was advised to continue using Aquaphor or a foot cream for skin care. The use of compression stockings was recommended to manage swelling throughout the day. If he experiences any abnormalities, he should contact the clinic immediately. If calluses or open wounds develop, tendon release surgery may be considered to straighten the toes.Reviewed proper basic stretching and manual therapy exercises along with appropriate shoes and  activity.  Discussed proper use and/or avoidance of OTC anti-inflammatories.  Patient is to call with any additional issues or concerns.  Greater than 20 minutes was spent before, during, and after evaluation for patient care.    The encounter note is created with the use of AI technology.  I do apologize if there are typos and/or confusion within the note.  Please feel free to contact me or my office with any questions or concerns.    Follow-up  The patient will follow up in 4 to 6 months.                 Patient or patient representative verbalized consent for the use of Ambient Listening during the visit with  REBECCA Baeza DPM for chart documentation. 4/23/2025  07:00 EDT    REBECCA Baeza DPM

## 2025-04-25 ENCOUNTER — TRANSCRIBE ORDERS (OUTPATIENT)
Dept: ADMINISTRATIVE | Facility: HOSPITAL | Age: 75
End: 2025-04-25
Payer: MEDICARE

## 2025-04-25 DIAGNOSIS — R91.1 LUNG NODULE: Primary | ICD-10-CM

## 2025-04-28 ENCOUNTER — TRANSCRIBE ORDERS (OUTPATIENT)
Dept: ADMINISTRATIVE | Facility: HOSPITAL | Age: 75
End: 2025-04-28
Payer: MEDICARE

## 2025-06-05 ENCOUNTER — HOSPITAL ENCOUNTER (OUTPATIENT)
Dept: CT IMAGING | Facility: HOSPITAL | Age: 75
Discharge: HOME | End: 2025-06-05
Admitting: FAMILY MEDICINE
Payer: MEDICARE

## 2025-06-05 DIAGNOSIS — R91.1 LUNG NODULE: ICD-10-CM

## 2025-06-05 PROCEDURE — 71260 CT THORAX DX C+: CPT

## 2025-06-05 PROCEDURE — 25510000001 IOPAMIDOL PER 1 ML: Performed by: FAMILY MEDICINE

## 2025-06-05 RX ORDER — IOPAMIDOL 755 MG/ML
100 INJECTION, SOLUTION INTRAVASCULAR
Status: COMPLETED | OUTPATIENT
Start: 2025-06-05 | End: 2025-06-05

## 2025-06-05 RX ADMIN — IOPAMIDOL 100 ML: 755 INJECTION, SOLUTION INTRAVENOUS at 15:10

## (undated) DEVICE — SOLUTION,WATER,IRRIGATION,1000ML,STERILE: Brand: MEDLINE

## (undated) DEVICE — PRECISION THIN, OFFSET (7.0 X 0.38 X 20.0MM)

## (undated) DEVICE — DRAPE,U/ SHT,SPLIT,PLAS,STERIL: Brand: MEDLINE

## (undated) DEVICE — GLV SURG BIOGEL M LTX PF 7 1/2

## (undated) DEVICE — ANTIBACTERIAL UNDYED BRAIDED (POLYGLACTIN 910), SYNTHETIC ABSORBABLE SUTURE: Brand: COATED VICRYL

## (undated) DEVICE — DRAPE,TOWEL,LARGE,INVISISHIELD: Brand: MEDLINE

## (undated) DEVICE — THE STERILE CAMERA HANDLE COVER IS FOR USE WITH THE STERIS SURGICAL LIGHTING AND VISUALIZATION SYSTEMS.

## (undated) DEVICE — UNDERGLV SURG BIOGEL INDICAT PI SZ8 BLU

## (undated) DEVICE — INTENDED FOR TISSUE SEPARATION, AND OTHER PROCEDURES THAT REQUIRE A SHARP SURGICAL BLADE TO PUNCTURE OR CUT.: Brand: BARD-PARKER ® CARBON RIB-BACK BLADES

## (undated) DEVICE — MICRO DUAL CUT (9.0 X 0.38 X 25.0MM)

## (undated) DEVICE — SUT ETHLN 3/0 FS1 30IN 669H

## (undated) DEVICE — PK EXTREM 50

## (undated) DEVICE — GOWN,REINFORCE,POLY,SIRUS,BREATH SLV,XLG: Brand: MEDLINE

## (undated) DEVICE — BANDAGE,GAUZE,BULKEE II,4.5"X4.1YD,STRL: Brand: MEDLINE

## (undated) DEVICE — DRSNG GZ CURAD XEROFORM PETROLTM OVERWRP 1X8IN STRL

## (undated) DEVICE — THE STERILE LIGHT HANDLE COVER IS USED WITH STERIS SURGICAL LIGHTING AND VISUALIZATION SYSTEMS.

## (undated) DEVICE — CUFF TOURNI 1BLADDER 1PRT 18IN STRL

## (undated) DEVICE — GAUZE,SPONGE,FLUFF,6"X6.75",STRL,5/TRAY: Brand: MEDLINE

## (undated) DEVICE — PROXIMATE RH ROTATING HEAD SKIN STAPLERS (35 WIDE) CONTAINS 35 STAINLESS STEEL STAPLES: Brand: PROXIMATE

## (undated) DEVICE — KT SURG TURNOVER 050

## (undated) DEVICE — APPL CHLORAPREP HI/LITE TINTED 10.5ML ORNG

## (undated) DEVICE — BNDG,ELSTC,MATRIX,STRL,4"X5YD,LF,HOOK&LP: Brand: MEDLINE